# Patient Record
Sex: FEMALE | Race: WHITE | NOT HISPANIC OR LATINO | Employment: OTHER | URBAN - NONMETROPOLITAN AREA
[De-identification: names, ages, dates, MRNs, and addresses within clinical notes are randomized per-mention and may not be internally consistent; named-entity substitution may affect disease eponyms.]

---

## 2018-08-01 ENCOUNTER — HOSPITAL ENCOUNTER (EMERGENCY)
Facility: HOSPITAL | Age: 83
Discharge: HOME | End: 2018-08-01
Attending: EMERGENCY MEDICINE | Admitting: EMERGENCY MEDICINE

## 2018-08-01 ENCOUNTER — APPOINTMENT (OUTPATIENT)
Dept: ULTRASOUND IMAGING | Facility: HOSPITAL | Age: 83
End: 2018-08-01

## 2018-08-01 VITALS
TEMPERATURE: 99 F | HEIGHT: 64 IN | SYSTOLIC BLOOD PRESSURE: 160 MMHG | BODY MASS INDEX: 25.61 KG/M2 | WEIGHT: 150 LBS | RESPIRATION RATE: 18 BRPM | OXYGEN SATURATION: 98 % | DIASTOLIC BLOOD PRESSURE: 87 MMHG | HEART RATE: 63 BPM

## 2018-08-01 DIAGNOSIS — M79.604 RIGHT LEG PAIN: Primary | ICD-10-CM

## 2018-08-01 PROCEDURE — 99283 EMERGENCY DEPT VISIT LOW MDM: CPT

## 2018-08-01 PROCEDURE — 93971 EXTREMITY STUDY: CPT | Performed by: RADIOLOGY

## 2018-08-01 PROCEDURE — 93971 EXTREMITY STUDY: CPT

## 2018-08-01 RX ORDER — CLONIDINE HYDROCHLORIDE 0.1 MG/1
0.1 TABLET ORAL ONCE
Status: DISCONTINUED | OUTPATIENT
Start: 2018-08-01 | End: 2018-08-01 | Stop reason: HOSPADM

## 2018-08-01 RX ORDER — ASPIRIN 81 MG/1
81 TABLET, CHEWABLE ORAL DAILY
COMMUNITY
End: 2023-01-30

## 2018-08-01 RX ORDER — ATENOLOL 25 MG/1
25 TABLET ORAL DAILY
COMMUNITY
End: 2022-06-10 | Stop reason: DRUGHIGH

## 2018-08-01 RX ORDER — NAPROXEN SODIUM 220 MG
220 TABLET ORAL 2 TIMES DAILY PRN
COMMUNITY
End: 2018-08-01

## 2018-08-01 RX ORDER — NAPROXEN 500 MG/1
500 TABLET ORAL 2 TIMES DAILY PRN
Qty: 12 TABLET | Refills: 0 | Status: SHIPPED | OUTPATIENT
Start: 2018-08-01 | End: 2022-06-10

## 2018-08-01 NOTE — ED PROVIDER NOTES
Subjective     History provided by:  Patient   used: No    Lower Extremity Issue   Location:  Leg  Leg location:  R lower leg  Pain details:     Quality:  Aching    Radiates to:  Does not radiate    Severity:  Mild    Onset quality:  Sudden    Duration:  4 days    Timing:  Constant    Progression:  Worsening  Chronicity:  New  Dislocation: no    Foreign body present:  Unable to specify  Tetanus status:  Unknown  Prior injury to area:  No  Relieved by:  None tried  Worsened by:  Bearing weight, extension and flexion  Ineffective treatments:  None tried  Associated symptoms: no decreased ROM and no fever        Review of Systems   Constitutional: Negative for chills and fever.   HENT: Negative for congestion, ear pain and sore throat.    Respiratory: Negative for cough, shortness of breath and wheezing.    Cardiovascular: Negative for chest pain.   Gastrointestinal: Negative for diarrhea, nausea and vomiting.   Genitourinary: Negative for dysuria and flank pain.   Musculoskeletal: Positive for arthralgias.   Skin: Negative for rash.   Neurological: Negative for headaches.   Psychiatric/Behavioral: The patient is not nervous/anxious.    All other systems reviewed and are negative.      Past Medical History:   Diagnosis Date   • Arthritis    • DVT (deep venous thrombosis) (CMS/HCC)    • Hypertension        No Known Allergies    Past Surgical History:   Procedure Laterality Date   • HYSTERECTOMY     • LEG SURGERY Left        History reviewed. No pertinent family history.    Social History     Social History   • Marital status:      Social History Main Topics   • Smoking status: Never Smoker   • Alcohol use No   • Drug use: No   • Sexual activity: Defer     Other Topics Concern   • Not on file           Objective   Physical Exam   Constitutional: She is oriented to person, place, and time. She appears well-developed and well-nourished.   HENT:   Head: Normocephalic.   Mouth/Throat: Oropharynx is  clear and moist.   Neck: Neck supple.   Cardiovascular: Normal rate and regular rhythm.    Pulmonary/Chest: Effort normal and breath sounds normal.   Abdominal: Soft. Bowel sounds are normal. There is no tenderness.   Musculoskeletal: Normal range of motion.        Right lower leg: She exhibits tenderness and swelling.   Neurological: She is alert and oriented to person, place, and time.   Skin: Skin is warm and dry.   Psychiatric: She has a normal mood and affect. Her behavior is normal. Judgment and thought content normal.   Nursing note and vitals reviewed.      Procedures           ED Course                  MDM      Final diagnoses:   Right leg pain            KEN Patiño  08/01/18 2789

## 2018-08-01 NOTE — ED NOTES
/90 at this time. KEN Patiño aware states to hold clonidine 0.1 at this time.      Talita Reyes RN  08/01/18 1214

## 2021-05-15 ENCOUNTER — APPOINTMENT (OUTPATIENT)
Dept: GENERAL RADIOLOGY | Facility: HOSPITAL | Age: 86
End: 2021-05-15

## 2021-05-15 ENCOUNTER — HOSPITAL ENCOUNTER (OUTPATIENT)
Facility: HOSPITAL | Age: 86
Discharge: HOME | End: 2021-05-17
Attending: EMERGENCY MEDICINE | Admitting: SURGERY

## 2021-05-15 ENCOUNTER — APPOINTMENT (OUTPATIENT)
Dept: ULTRASOUND IMAGING | Facility: HOSPITAL | Age: 86
End: 2021-05-15

## 2021-05-15 DIAGNOSIS — K80.00 CALCULUS OF GALLBLADDER WITH ACUTE CHOLECYSTITIS WITHOUT OBSTRUCTION: Primary | ICD-10-CM

## 2021-05-15 DIAGNOSIS — K81.0 CHOLECYSTITIS, ACUTE: ICD-10-CM

## 2021-05-15 PROBLEM — Z86.718 HISTORY OF DVT (DEEP VEIN THROMBOSIS): Status: ACTIVE | Noted: 2021-05-15

## 2021-05-15 PROBLEM — N39.0 UTI (URINARY TRACT INFECTION): Status: ACTIVE | Noted: 2021-05-15

## 2021-05-15 PROBLEM — I10 ESSENTIAL HYPERTENSION: Status: ACTIVE | Noted: 2021-05-15

## 2021-05-15 LAB
ALBUMIN SERPL-MCNC: 3.7 G/DL (ref 3.5–5.2)
ALBUMIN/GLOB SERPL: 1.2 G/DL
ALP SERPL-CCNC: 80 U/L (ref 39–117)
ALT SERPL W P-5'-P-CCNC: 15 U/L (ref 1–33)
ANION GAP SERPL CALCULATED.3IONS-SCNC: 8 MMOL/L (ref 5–15)
AST SERPL-CCNC: 16 U/L (ref 1–32)
BACTERIA UR QL AUTO: ABNORMAL /HPF
BACTERIA UR QL AUTO: ABNORMAL /HPF
BASOPHILS # BLD AUTO: 0.03 10*3/MM3 (ref 0–0.2)
BASOPHILS NFR BLD AUTO: 0.2 % (ref 0–1.5)
BILIRUB SERPL-MCNC: 0.4 MG/DL (ref 0–1.2)
BILIRUB UR QL STRIP: ABNORMAL
BILIRUB UR QL STRIP: NEGATIVE
BUN SERPL-MCNC: 21 MG/DL (ref 8–23)
BUN/CREAT SERPL: 17.2 (ref 7–25)
CALCIUM SPEC-SCNC: 8.8 MG/DL (ref 8.6–10.5)
CHLORIDE SERPL-SCNC: 102 MMOL/L (ref 98–107)
CLARITY UR: ABNORMAL
CLARITY UR: CLEAR
CO2 SERPL-SCNC: 25 MMOL/L (ref 22–29)
COLOR UR: ABNORMAL
COLOR UR: YELLOW
CREAT SERPL-MCNC: 1.22 MG/DL (ref 0.57–1)
D-LACTATE SERPL-SCNC: 2.3 MMOL/L (ref 0.5–2)
DEPRECATED RDW RBC AUTO: 46.2 FL (ref 37–54)
EOSINOPHIL # BLD AUTO: 0.03 10*3/MM3 (ref 0–0.4)
EOSINOPHIL NFR BLD AUTO: 0.2 % (ref 0.3–6.2)
ERYTHROCYTE [DISTWIDTH] IN BLOOD BY AUTOMATED COUNT: 13.3 % (ref 12.3–15.4)
FLUAV RNA RESP QL NAA+PROBE: NOT DETECTED
FLUBV RNA RESP QL NAA+PROBE: NOT DETECTED
GFR SERPL CREATININE-BSD FRML MDRD: 42 ML/MIN/1.73
GLOBULIN UR ELPH-MCNC: 3 GM/DL
GLUCOSE SERPL-MCNC: 129 MG/DL (ref 65–99)
GLUCOSE UR STRIP-MCNC: NEGATIVE MG/DL
GLUCOSE UR STRIP-MCNC: NEGATIVE MG/DL
HCT VFR BLD AUTO: 43.1 % (ref 34–46.6)
HGB BLD-MCNC: 14 G/DL (ref 12–15.9)
HGB UR QL STRIP.AUTO: ABNORMAL
HGB UR QL STRIP.AUTO: ABNORMAL
HOLD SPECIMEN: NORMAL
HYALINE CASTS UR QL AUTO: ABNORMAL /LPF
HYALINE CASTS UR QL AUTO: ABNORMAL /LPF
IMM GRANULOCYTES # BLD AUTO: 0.08 10*3/MM3 (ref 0–0.05)
IMM GRANULOCYTES NFR BLD AUTO: 0.6 % (ref 0–0.5)
KETONES UR QL STRIP: ABNORMAL
KETONES UR QL STRIP: NEGATIVE
LEUKOCYTE ESTERASE UR QL STRIP.AUTO: ABNORMAL
LEUKOCYTE ESTERASE UR QL STRIP.AUTO: NEGATIVE
LIPASE SERPL-CCNC: 26 U/L (ref 13–60)
LYMPHOCYTES # BLD AUTO: 0.55 10*3/MM3 (ref 0.7–3.1)
LYMPHOCYTES NFR BLD AUTO: 4 % (ref 19.6–45.3)
MCH RBC QN AUTO: 30.4 PG (ref 26.6–33)
MCHC RBC AUTO-ENTMCNC: 32.5 G/DL (ref 31.5–35.7)
MCV RBC AUTO: 93.5 FL (ref 79–97)
MONOCYTES # BLD AUTO: 0.45 10*3/MM3 (ref 0.1–0.9)
MONOCYTES NFR BLD AUTO: 3.3 % (ref 5–12)
MUCOUS THREADS URNS QL MICRO: ABNORMAL /HPF
NEUTROPHILS NFR BLD AUTO: 12.69 10*3/MM3 (ref 1.7–7)
NEUTROPHILS NFR BLD AUTO: 91.7 % (ref 42.7–76)
NITRITE UR QL STRIP: NEGATIVE
NITRITE UR QL STRIP: POSITIVE
NRBC BLD AUTO-RTO: 0 /100 WBC (ref 0–0.2)
PH UR STRIP.AUTO: 5.5 [PH] (ref 5–8)
PH UR STRIP.AUTO: 5.5 [PH] (ref 5–8)
PLATELET # BLD AUTO: 207 10*3/MM3 (ref 140–450)
PMV BLD AUTO: 11.5 FL (ref 6–12)
POTASSIUM SERPL-SCNC: 4.4 MMOL/L (ref 3.5–5.2)
PROT SERPL-MCNC: 6.7 G/DL (ref 6–8.5)
PROT UR QL STRIP: ABNORMAL
PROT UR QL STRIP: ABNORMAL
RBC # BLD AUTO: 4.61 10*6/MM3 (ref 3.77–5.28)
RBC # UR: ABNORMAL /HPF
RBC # UR: ABNORMAL /HPF
REF LAB TEST METHOD: ABNORMAL
REF LAB TEST METHOD: ABNORMAL
SARS-COV-2 RNA RESP QL NAA+PROBE: NOT DETECTED
SODIUM SERPL-SCNC: 135 MMOL/L (ref 136–145)
SP GR UR STRIP: 1.03 (ref 1–1.03)
SP GR UR STRIP: 1.03 (ref 1–1.03)
SQUAMOUS #/AREA URNS HPF: ABNORMAL /HPF
SQUAMOUS #/AREA URNS HPF: ABNORMAL /HPF
TROPONIN T SERPL-MCNC: <0.01 NG/ML (ref 0–0.03)
UROBILINOGEN UR QL STRIP: ABNORMAL
UROBILINOGEN UR QL STRIP: ABNORMAL
WBC # BLD AUTO: 13.83 10*3/MM3 (ref 3.4–10.8)
WBC UR QL AUTO: ABNORMAL /HPF
WBC UR QL AUTO: ABNORMAL /HPF
WHOLE BLOOD HOLD SPECIMEN: NORMAL
WHOLE BLOOD HOLD SPECIMEN: NORMAL

## 2021-05-15 PROCEDURE — 84484 ASSAY OF TROPONIN QUANT: CPT | Performed by: EMERGENCY MEDICINE

## 2021-05-15 PROCEDURE — 83690 ASSAY OF LIPASE: CPT | Performed by: EMERGENCY MEDICINE

## 2021-05-15 PROCEDURE — P9612 CATHETERIZE FOR URINE SPEC: HCPCS

## 2021-05-15 PROCEDURE — 80053 COMPREHEN METABOLIC PANEL: CPT | Performed by: EMERGENCY MEDICINE

## 2021-05-15 PROCEDURE — 76705 ECHO EXAM OF ABDOMEN: CPT

## 2021-05-15 PROCEDURE — 25010000002 ONDANSETRON PER 1 MG: Performed by: SURGERY

## 2021-05-15 PROCEDURE — 96361 HYDRATE IV INFUSION ADD-ON: CPT

## 2021-05-15 PROCEDURE — 25010000002 PIPERACILLIN SOD-TAZOBACTAM PER 1 G: Performed by: INTERNAL MEDICINE

## 2021-05-15 PROCEDURE — 81001 URINALYSIS AUTO W/SCOPE: CPT | Performed by: NURSE PRACTITIONER

## 2021-05-15 PROCEDURE — 25010000002 ONDANSETRON PER 1 MG: Performed by: INTERNAL MEDICINE

## 2021-05-15 PROCEDURE — C9803 HOPD COVID-19 SPEC COLLECT: HCPCS

## 2021-05-15 PROCEDURE — 96372 THER/PROPH/DIAG INJ SC/IM: CPT

## 2021-05-15 PROCEDURE — 93005 ELECTROCARDIOGRAM TRACING: CPT | Performed by: EMERGENCY MEDICINE

## 2021-05-15 PROCEDURE — 25010000002 MORPHINE PER 10 MG: Performed by: INTERNAL MEDICINE

## 2021-05-15 PROCEDURE — 96365 THER/PROPH/DIAG IV INF INIT: CPT

## 2021-05-15 PROCEDURE — 83605 ASSAY OF LACTIC ACID: CPT | Performed by: NURSE PRACTITIONER

## 2021-05-15 PROCEDURE — 25010000002 HEPARIN (PORCINE) PER 1000 UNITS: Performed by: INTERNAL MEDICINE

## 2021-05-15 PROCEDURE — 81001 URINALYSIS AUTO W/SCOPE: CPT | Performed by: EMERGENCY MEDICINE

## 2021-05-15 PROCEDURE — G0378 HOSPITAL OBSERVATION PER HR: HCPCS

## 2021-05-15 PROCEDURE — 96375 TX/PRO/DX INJ NEW DRUG ADDON: CPT

## 2021-05-15 PROCEDURE — 71045 X-RAY EXAM CHEST 1 VIEW: CPT

## 2021-05-15 PROCEDURE — 87040 BLOOD CULTURE FOR BACTERIA: CPT | Performed by: INTERNAL MEDICINE

## 2021-05-15 PROCEDURE — 87636 SARSCOV2 & INF A&B AMP PRB: CPT | Performed by: INTERNAL MEDICINE

## 2021-05-15 PROCEDURE — 99284 EMERGENCY DEPT VISIT MOD MDM: CPT

## 2021-05-15 PROCEDURE — 85025 COMPLETE CBC W/AUTO DIFF WBC: CPT | Performed by: EMERGENCY MEDICINE

## 2021-05-15 PROCEDURE — 99223 1ST HOSP IP/OBS HIGH 75: CPT | Performed by: INTERNAL MEDICINE

## 2021-05-15 RX ORDER — ONDANSETRON 2 MG/ML
4 INJECTION INTRAMUSCULAR; INTRAVENOUS EVERY 6 HOURS PRN
Status: DISCONTINUED | OUTPATIENT
Start: 2021-05-15 | End: 2021-05-16 | Stop reason: SDUPTHER

## 2021-05-15 RX ORDER — HEPARIN SODIUM 5000 [USP'U]/ML
5000 INJECTION, SOLUTION INTRAVENOUS; SUBCUTANEOUS EVERY 8 HOURS SCHEDULED
Status: DISCONTINUED | OUTPATIENT
Start: 2021-05-16 | End: 2021-05-17 | Stop reason: HOSPADM

## 2021-05-15 RX ORDER — NALOXONE HCL 0.4 MG/ML
0.4 VIAL (ML) INJECTION
Status: DISCONTINUED | OUTPATIENT
Start: 2021-05-15 | End: 2021-05-17 | Stop reason: HOSPADM

## 2021-05-15 RX ORDER — HEPARIN SODIUM 5000 [USP'U]/ML
5000 INJECTION, SOLUTION INTRAVENOUS; SUBCUTANEOUS EVERY 12 HOURS SCHEDULED
Status: COMPLETED | OUTPATIENT
Start: 2021-05-15 | End: 2021-05-15

## 2021-05-15 RX ORDER — MORPHINE SULFATE 2 MG/ML
2 INJECTION, SOLUTION INTRAMUSCULAR; INTRAVENOUS
Status: DISCONTINUED | OUTPATIENT
Start: 2021-05-15 | End: 2021-05-15 | Stop reason: SDUPTHER

## 2021-05-15 RX ORDER — SODIUM CHLORIDE, SODIUM LACTATE, POTASSIUM CHLORIDE, CALCIUM CHLORIDE 600; 310; 30; 20 MG/100ML; MG/100ML; MG/100ML; MG/100ML
75 INJECTION, SOLUTION INTRAVENOUS CONTINUOUS
Status: DISCONTINUED | OUTPATIENT
Start: 2021-05-15 | End: 2021-05-16

## 2021-05-15 RX ORDER — ONDANSETRON 4 MG/1
4 TABLET, FILM COATED ORAL EVERY 6 HOURS PRN
Status: DISCONTINUED | OUTPATIENT
Start: 2021-05-15 | End: 2021-05-17 | Stop reason: HOSPADM

## 2021-05-15 RX ORDER — MORPHINE SULFATE 2 MG/ML
1 INJECTION, SOLUTION INTRAMUSCULAR; INTRAVENOUS EVERY 4 HOURS PRN
Status: DISCONTINUED | OUTPATIENT
Start: 2021-05-15 | End: 2021-05-17 | Stop reason: HOSPADM

## 2021-05-15 RX ORDER — ATENOLOL 25 MG/1
25 TABLET ORAL DAILY
Status: DISCONTINUED | OUTPATIENT
Start: 2021-05-16 | End: 2021-05-17 | Stop reason: HOSPADM

## 2021-05-15 RX ORDER — SODIUM CHLORIDE 0.9 % (FLUSH) 0.9 %
10 SYRINGE (ML) INJECTION AS NEEDED
Status: DISCONTINUED | OUTPATIENT
Start: 2021-05-15 | End: 2021-05-17 | Stop reason: HOSPADM

## 2021-05-15 RX ORDER — SODIUM CHLORIDE, SODIUM LACTATE, POTASSIUM CHLORIDE, CALCIUM CHLORIDE 600; 310; 30; 20 MG/100ML; MG/100ML; MG/100ML; MG/100ML
75 INJECTION, SOLUTION INTRAVENOUS CONTINUOUS
Status: DISCONTINUED | OUTPATIENT
Start: 2021-05-15 | End: 2021-05-15 | Stop reason: SDUPTHER

## 2021-05-15 RX ORDER — ONDANSETRON 2 MG/ML
4 INJECTION INTRAMUSCULAR; INTRAVENOUS EVERY 6 HOURS PRN
Status: DISCONTINUED | OUTPATIENT
Start: 2021-05-15 | End: 2021-05-17 | Stop reason: HOSPADM

## 2021-05-15 RX ADMIN — SODIUM CHLORIDE, POTASSIUM CHLORIDE, SODIUM LACTATE AND CALCIUM CHLORIDE 75 ML/HR: 600; 310; 30; 20 INJECTION, SOLUTION INTRAVENOUS at 22:09

## 2021-05-15 RX ADMIN — ONDANSETRON 4 MG: 2 INJECTION INTRAMUSCULAR; INTRAVENOUS at 21:03

## 2021-05-15 RX ADMIN — SODIUM CHLORIDE, POTASSIUM CHLORIDE, SODIUM LACTATE AND CALCIUM CHLORIDE 75 ML/HR: 600; 310; 30; 20 INJECTION, SOLUTION INTRAVENOUS at 21:38

## 2021-05-15 RX ADMIN — SODIUM CHLORIDE 500 ML: 9 INJECTION, SOLUTION INTRAVENOUS at 18:25

## 2021-05-15 RX ADMIN — HEPARIN SODIUM 5000 UNITS: 5000 INJECTION INTRAVENOUS; SUBCUTANEOUS at 21:00

## 2021-05-15 RX ADMIN — TAZOBACTAM SODIUM AND PIPERACILLIN SODIUM 3.38 G: 375; 3 INJECTION, SOLUTION INTRAVENOUS at 21:08

## 2021-05-15 RX ADMIN — MORPHINE SULFATE 2 MG: 2 INJECTION, SOLUTION INTRAMUSCULAR; INTRAVENOUS at 21:04

## 2021-05-15 RX ADMIN — SODIUM CHLORIDE, POTASSIUM CHLORIDE, SODIUM LACTATE AND CALCIUM CHLORIDE 500 ML: 600; 310; 30; 20 INJECTION, SOLUTION INTRAVENOUS at 20:58

## 2021-05-16 ENCOUNTER — ANESTHESIA EVENT (OUTPATIENT)
Dept: PERIOP | Facility: HOSPITAL | Age: 86
End: 2021-05-16

## 2021-05-16 ENCOUNTER — APPOINTMENT (OUTPATIENT)
Dept: GENERAL RADIOLOGY | Facility: HOSPITAL | Age: 86
End: 2021-05-16

## 2021-05-16 ENCOUNTER — ANESTHESIA (OUTPATIENT)
Dept: PERIOP | Facility: HOSPITAL | Age: 86
End: 2021-05-16

## 2021-05-16 LAB
ANION GAP SERPL CALCULATED.3IONS-SCNC: 7 MMOL/L (ref 5–15)
BASOPHILS # BLD AUTO: 0.01 10*3/MM3 (ref 0–0.2)
BASOPHILS NFR BLD AUTO: 0.2 % (ref 0–1.5)
BUN SERPL-MCNC: 20 MG/DL (ref 8–23)
BUN/CREAT SERPL: 18.3 (ref 7–25)
CALCIUM SPEC-SCNC: 8.5 MG/DL (ref 8.6–10.5)
CHLORIDE SERPL-SCNC: 104 MMOL/L (ref 98–107)
CO2 SERPL-SCNC: 26 MMOL/L (ref 22–29)
CREAT SERPL-MCNC: 1.09 MG/DL (ref 0.57–1)
DEPRECATED RDW RBC AUTO: 45.6 FL (ref 37–54)
EOSINOPHIL # BLD AUTO: 0.02 10*3/MM3 (ref 0–0.4)
EOSINOPHIL NFR BLD AUTO: 0.4 % (ref 0.3–6.2)
ERYTHROCYTE [DISTWIDTH] IN BLOOD BY AUTOMATED COUNT: 13.3 % (ref 12.3–15.4)
GFR SERPL CREATININE-BSD FRML MDRD: 48 ML/MIN/1.73
GLUCOSE SERPL-MCNC: 103 MG/DL (ref 65–99)
HCT VFR BLD AUTO: 37 % (ref 34–46.6)
HGB BLD-MCNC: 12 G/DL (ref 12–15.9)
IMM GRANULOCYTES # BLD AUTO: 0.02 10*3/MM3 (ref 0–0.05)
IMM GRANULOCYTES NFR BLD AUTO: 0.4 % (ref 0–0.5)
LYMPHOCYTES # BLD AUTO: 0.46 10*3/MM3 (ref 0.7–3.1)
LYMPHOCYTES NFR BLD AUTO: 8.5 % (ref 19.6–45.3)
MCH RBC QN AUTO: 30.5 PG (ref 26.6–33)
MCHC RBC AUTO-ENTMCNC: 32.4 G/DL (ref 31.5–35.7)
MCV RBC AUTO: 93.9 FL (ref 79–97)
MONOCYTES # BLD AUTO: 0.32 10*3/MM3 (ref 0.1–0.9)
MONOCYTES NFR BLD AUTO: 5.9 % (ref 5–12)
NEUTROPHILS NFR BLD AUTO: 4.6 10*3/MM3 (ref 1.7–7)
NEUTROPHILS NFR BLD AUTO: 84.6 % (ref 42.7–76)
NRBC BLD AUTO-RTO: 0 /100 WBC (ref 0–0.2)
PLATELET # BLD AUTO: 143 10*3/MM3 (ref 140–450)
PMV BLD AUTO: 12 FL (ref 6–12)
POTASSIUM SERPL-SCNC: 4.1 MMOL/L (ref 3.5–5.2)
RBC # BLD AUTO: 3.94 10*6/MM3 (ref 3.77–5.28)
SODIUM SERPL-SCNC: 137 MMOL/L (ref 136–145)
WBC # BLD AUTO: 5.43 10*3/MM3 (ref 3.4–10.8)

## 2021-05-16 PROCEDURE — A9270 NON-COVERED ITEM OR SERVICE: HCPCS | Performed by: SURGERY

## 2021-05-16 PROCEDURE — 25010000002 FENTANYL CITRATE (PF) 100 MCG/2ML SOLUTION: Performed by: NURSE ANESTHETIST, CERTIFIED REGISTERED

## 2021-05-16 PROCEDURE — 25010000002 NEOSTIGMINE 10 MG/10ML SOLUTION: Performed by: NURSE ANESTHETIST, CERTIFIED REGISTERED

## 2021-05-16 PROCEDURE — 25010000002 FENTANYL CITRATE (PF) 50 MCG/ML SOLUTION: Performed by: NURSE ANESTHETIST, CERTIFIED REGISTERED

## 2021-05-16 PROCEDURE — 74300 X-RAY BILE DUCTS/PANCREAS: CPT

## 2021-05-16 PROCEDURE — 25010000002 PIPERACILLIN SOD-TAZOBACTAM PER 1 G: Performed by: NURSE PRACTITIONER

## 2021-05-16 PROCEDURE — 25010000002 PIPERACILLIN SOD-TAZOBACTAM PER 1 G: Performed by: SURGERY

## 2021-05-16 PROCEDURE — 63710000001 DOCUSATE SODIUM 100 MG CAPSULE: Performed by: SURGERY

## 2021-05-16 PROCEDURE — 25010000002 PROPOFOL 10 MG/ML EMULSION: Performed by: NURSE ANESTHETIST, CERTIFIED REGISTERED

## 2021-05-16 PROCEDURE — 25010000002 DEXAMETHASONE PER 1 MG: Performed by: NURSE ANESTHETIST, CERTIFIED REGISTERED

## 2021-05-16 PROCEDURE — 96361 HYDRATE IV INFUSION ADD-ON: CPT

## 2021-05-16 PROCEDURE — 85025 COMPLETE CBC W/AUTO DIFF WBC: CPT | Performed by: NURSE PRACTITIONER

## 2021-05-16 PROCEDURE — 80048 BASIC METABOLIC PNL TOTAL CA: CPT | Performed by: NURSE PRACTITIONER

## 2021-05-16 PROCEDURE — 63710000001 ATENOLOL 25 MG TABLET: Performed by: SURGERY

## 2021-05-16 PROCEDURE — 25010000002 IOPAMIDOL 61 % SOLUTION: Performed by: SURGERY

## 2021-05-16 PROCEDURE — 99226 PR SBSQ OBSERVATION CARE/DAY 35 MINUTES: CPT | Performed by: NURSE PRACTITIONER

## 2021-05-16 PROCEDURE — 25010000003 CEFAZOLIN PER 500 MG: Performed by: NURSE ANESTHETIST, CERTIFIED REGISTERED

## 2021-05-16 PROCEDURE — G0378 HOSPITAL OBSERVATION PER HR: HCPCS

## 2021-05-16 PROCEDURE — 25010000002 MORPHINE PER 10 MG: Performed by: SURGERY

## 2021-05-16 PROCEDURE — 25010000002 ONDANSETRON PER 1 MG: Performed by: NURSE ANESTHETIST, CERTIFIED REGISTERED

## 2021-05-16 PROCEDURE — C1889 IMPLANT/INSERT DEVICE, NOC: HCPCS | Performed by: SURGERY

## 2021-05-16 PROCEDURE — 25010000002 ONDANSETRON PER 1 MG: Performed by: SURGERY

## 2021-05-16 PROCEDURE — 88304 TISSUE EXAM BY PATHOLOGIST: CPT | Performed by: SURGERY

## 2021-05-16 PROCEDURE — 25010000002 HEPARIN (PORCINE) PER 1000 UNITS: Performed by: SURGERY

## 2021-05-16 DEVICE — LIGACLIP 10-M/L, 10MM ENDOSCOPIC ROTATING MULTIPLE CLIP APPLIERS
Type: IMPLANTABLE DEVICE | Site: ABDOMEN | Status: FUNCTIONAL
Brand: LIGACLIP

## 2021-05-16 RX ORDER — LIDOCAINE HYDROCHLORIDE 10 MG/ML
INJECTION, SOLUTION EPIDURAL; INFILTRATION; INTRACAUDAL; PERINEURAL AS NEEDED
Status: DISCONTINUED | OUTPATIENT
Start: 2021-05-16 | End: 2021-05-16 | Stop reason: SURG

## 2021-05-16 RX ORDER — FAMOTIDINE 10 MG/ML
20 INJECTION, SOLUTION INTRAVENOUS
Status: COMPLETED | OUTPATIENT
Start: 2021-05-16 | End: 2021-05-16

## 2021-05-16 RX ORDER — BUPIVACAINE HYDROCHLORIDE AND EPINEPHRINE 2.5; 5 MG/ML; UG/ML
INJECTION, SOLUTION EPIDURAL; INFILTRATION; INTRACAUDAL; PERINEURAL AS NEEDED
Status: DISCONTINUED | OUTPATIENT
Start: 2021-05-16 | End: 2021-05-16 | Stop reason: HOSPADM

## 2021-05-16 RX ORDER — OXYCODONE HYDROCHLORIDE AND ACETAMINOPHEN 5; 325 MG/1; MG/1
2 TABLET ORAL EVERY 4 HOURS PRN
Status: DISCONTINUED | OUTPATIENT
Start: 2021-05-16 | End: 2021-05-17 | Stop reason: HOSPADM

## 2021-05-16 RX ORDER — SODIUM CHLORIDE, SODIUM LACTATE, POTASSIUM CHLORIDE, CALCIUM CHLORIDE 600; 310; 30; 20 MG/100ML; MG/100ML; MG/100ML; MG/100ML
INJECTION, SOLUTION INTRAVENOUS CONTINUOUS PRN
Status: DISCONTINUED | OUTPATIENT
Start: 2021-05-16 | End: 2021-05-16 | Stop reason: SURG

## 2021-05-16 RX ORDER — FAMOTIDINE 20 MG/1
20 TABLET, FILM COATED ORAL ONCE
Status: CANCELLED | OUTPATIENT
Start: 2021-05-16 | End: 2021-05-16

## 2021-05-16 RX ORDER — MIDAZOLAM HYDROCHLORIDE 1 MG/ML
0.5 INJECTION INTRAMUSCULAR; INTRAVENOUS
Status: CANCELLED | OUTPATIENT
Start: 2021-05-16

## 2021-05-16 RX ORDER — SODIUM CHLORIDE 0.9 % (FLUSH) 0.9 %
10 SYRINGE (ML) INJECTION EVERY 12 HOURS SCHEDULED
Status: CANCELLED | OUTPATIENT
Start: 2021-05-16

## 2021-05-16 RX ORDER — SODIUM CHLORIDE, SODIUM LACTATE, POTASSIUM CHLORIDE, CALCIUM CHLORIDE 600; 310; 30; 20 MG/100ML; MG/100ML; MG/100ML; MG/100ML
100 INJECTION, SOLUTION INTRAVENOUS CONTINUOUS
Status: DISCONTINUED | OUTPATIENT
Start: 2021-05-16 | End: 2021-05-17 | Stop reason: HOSPADM

## 2021-05-16 RX ORDER — DOCUSATE SODIUM 100 MG/1
100 CAPSULE, LIQUID FILLED ORAL 2 TIMES DAILY
Status: DISCONTINUED | OUTPATIENT
Start: 2021-05-16 | End: 2021-05-17 | Stop reason: HOSPADM

## 2021-05-16 RX ORDER — SODIUM CHLORIDE, SODIUM LACTATE, POTASSIUM CHLORIDE, CALCIUM CHLORIDE 600; 310; 30; 20 MG/100ML; MG/100ML; MG/100ML; MG/100ML
9 INJECTION, SOLUTION INTRAVENOUS CONTINUOUS
Status: CANCELLED | OUTPATIENT
Start: 2021-05-16

## 2021-05-16 RX ORDER — LABETALOL HYDROCHLORIDE 5 MG/ML
5 INJECTION, SOLUTION INTRAVENOUS
Status: DISCONTINUED | OUTPATIENT
Start: 2021-05-16 | End: 2021-05-16 | Stop reason: HOSPADM

## 2021-05-16 RX ORDER — FAMOTIDINE 10 MG/ML
20 INJECTION, SOLUTION INTRAVENOUS ONCE
Status: CANCELLED | OUTPATIENT
Start: 2021-05-16 | End: 2021-05-16

## 2021-05-16 RX ORDER — CEFAZOLIN SODIUM 1 G/3ML
INJECTION, POWDER, FOR SOLUTION INTRAMUSCULAR; INTRAVENOUS AS NEEDED
Status: DISCONTINUED | OUTPATIENT
Start: 2021-05-16 | End: 2021-05-16 | Stop reason: SURG

## 2021-05-16 RX ORDER — ONDANSETRON 2 MG/ML
4 INJECTION INTRAMUSCULAR; INTRAVENOUS EVERY 6 HOURS PRN
Status: DISCONTINUED | OUTPATIENT
Start: 2021-05-16 | End: 2021-05-17 | Stop reason: HOSPADM

## 2021-05-16 RX ORDER — DEXAMETHASONE SODIUM PHOSPHATE 4 MG/ML
INJECTION, SOLUTION INTRA-ARTICULAR; INTRALESIONAL; INTRAMUSCULAR; INTRAVENOUS; SOFT TISSUE AS NEEDED
Status: DISCONTINUED | OUTPATIENT
Start: 2021-05-16 | End: 2021-05-16 | Stop reason: SURG

## 2021-05-16 RX ORDER — SODIUM CHLORIDE 0.9 % (FLUSH) 0.9 %
10 SYRINGE (ML) INJECTION AS NEEDED
Status: DISCONTINUED | OUTPATIENT
Start: 2021-05-16 | End: 2021-05-16 | Stop reason: HOSPADM

## 2021-05-16 RX ORDER — ESMOLOL HYDROCHLORIDE 10 MG/ML
INJECTION INTRAVENOUS AS NEEDED
Status: DISCONTINUED | OUTPATIENT
Start: 2021-05-16 | End: 2021-05-16 | Stop reason: SURG

## 2021-05-16 RX ORDER — ONDANSETRON 2 MG/ML
INJECTION INTRAMUSCULAR; INTRAVENOUS AS NEEDED
Status: DISCONTINUED | OUTPATIENT
Start: 2021-05-16 | End: 2021-05-16 | Stop reason: SURG

## 2021-05-16 RX ORDER — HYDROMORPHONE HYDROCHLORIDE 1 MG/ML
0.5 INJECTION, SOLUTION INTRAMUSCULAR; INTRAVENOUS; SUBCUTANEOUS
Status: DISCONTINUED | OUTPATIENT
Start: 2021-05-16 | End: 2021-05-16 | Stop reason: HOSPADM

## 2021-05-16 RX ORDER — EPHEDRINE SULFATE 50 MG/ML
INJECTION, SOLUTION INTRAVENOUS AS NEEDED
Status: DISCONTINUED | OUTPATIENT
Start: 2021-05-16 | End: 2021-05-16 | Stop reason: SURG

## 2021-05-16 RX ORDER — FENTANYL CITRATE 50 UG/ML
50 INJECTION, SOLUTION INTRAMUSCULAR; INTRAVENOUS
Status: DISCONTINUED | OUTPATIENT
Start: 2021-05-16 | End: 2021-05-16 | Stop reason: HOSPADM

## 2021-05-16 RX ORDER — SODIUM CHLORIDE 9 MG/ML
INJECTION, SOLUTION INTRAVENOUS AS NEEDED
Status: DISCONTINUED | OUTPATIENT
Start: 2021-05-16 | End: 2021-05-16 | Stop reason: HOSPADM

## 2021-05-16 RX ORDER — FENTANYL CITRATE 50 UG/ML
INJECTION, SOLUTION INTRAMUSCULAR; INTRAVENOUS AS NEEDED
Status: DISCONTINUED | OUTPATIENT
Start: 2021-05-16 | End: 2021-05-16 | Stop reason: SURG

## 2021-05-16 RX ORDER — SODIUM CHLORIDE, SODIUM LACTATE, POTASSIUM CHLORIDE, CALCIUM CHLORIDE 600; 310; 30; 20 MG/100ML; MG/100ML; MG/100ML; MG/100ML
1000 INJECTION, SOLUTION INTRAVENOUS CONTINUOUS
Status: DISCONTINUED | OUTPATIENT
Start: 2021-05-16 | End: 2021-05-16

## 2021-05-16 RX ORDER — ONDANSETRON 2 MG/ML
4 INJECTION INTRAMUSCULAR; INTRAVENOUS ONCE AS NEEDED
Status: DISCONTINUED | OUTPATIENT
Start: 2021-05-16 | End: 2021-05-16 | Stop reason: HOSPADM

## 2021-05-16 RX ORDER — FENTANYL CITRATE 50 UG/ML
50 INJECTION, SOLUTION INTRAMUSCULAR; INTRAVENOUS
Status: DISCONTINUED | OUTPATIENT
Start: 2021-05-16 | End: 2021-05-16 | Stop reason: SDUPTHER

## 2021-05-16 RX ORDER — IPRATROPIUM BROMIDE AND ALBUTEROL SULFATE 2.5; .5 MG/3ML; MG/3ML
3 SOLUTION RESPIRATORY (INHALATION) ONCE AS NEEDED
Status: DISCONTINUED | OUTPATIENT
Start: 2021-05-16 | End: 2021-05-16 | Stop reason: HOSPADM

## 2021-05-16 RX ORDER — NEOSTIGMINE METHYLSULFATE 1 MG/ML
INJECTION, SOLUTION INTRAVENOUS AS NEEDED
Status: DISCONTINUED | OUTPATIENT
Start: 2021-05-16 | End: 2021-05-16 | Stop reason: SURG

## 2021-05-16 RX ORDER — ROCURONIUM BROMIDE 10 MG/ML
INJECTION, SOLUTION INTRAVENOUS AS NEEDED
Status: DISCONTINUED | OUTPATIENT
Start: 2021-05-16 | End: 2021-05-16 | Stop reason: SURG

## 2021-05-16 RX ORDER — BUPIVACAINE HCL/0.9 % NACL/PF 0.125 %
PLASTIC BAG, INJECTION (ML) EPIDURAL AS NEEDED
Status: DISCONTINUED | OUTPATIENT
Start: 2021-05-16 | End: 2021-05-16 | Stop reason: SURG

## 2021-05-16 RX ORDER — LIDOCAINE HYDROCHLORIDE 10 MG/ML
0.5 INJECTION, SOLUTION EPIDURAL; INFILTRATION; INTRACAUDAL; PERINEURAL ONCE AS NEEDED
Status: CANCELLED | OUTPATIENT
Start: 2021-05-16

## 2021-05-16 RX ORDER — FAMOTIDINE 20 MG/1
20 TABLET, FILM COATED ORAL
Status: COMPLETED | OUTPATIENT
Start: 2021-05-16 | End: 2021-05-16

## 2021-05-16 RX ORDER — SODIUM CHLORIDE 0.9 % (FLUSH) 0.9 %
10 SYRINGE (ML) INJECTION AS NEEDED
Status: CANCELLED | OUTPATIENT
Start: 2021-05-16

## 2021-05-16 RX ORDER — PROMETHAZINE HYDROCHLORIDE 25 MG/1
25 TABLET ORAL EVERY 6 HOURS PRN
Status: DISCONTINUED | OUTPATIENT
Start: 2021-05-16 | End: 2021-05-17 | Stop reason: HOSPADM

## 2021-05-16 RX ORDER — HYDROMORPHONE HYDROCHLORIDE 1 MG/ML
0.5 INJECTION, SOLUTION INTRAMUSCULAR; INTRAVENOUS; SUBCUTANEOUS
Status: DISCONTINUED | OUTPATIENT
Start: 2021-05-16 | End: 2021-05-16 | Stop reason: SDUPTHER

## 2021-05-16 RX ORDER — PROPOFOL 10 MG/ML
VIAL (ML) INTRAVENOUS AS NEEDED
Status: DISCONTINUED | OUTPATIENT
Start: 2021-05-16 | End: 2021-05-16 | Stop reason: SURG

## 2021-05-16 RX ORDER — GLYCOPYRROLATE 0.2 MG/ML
INJECTION INTRAMUSCULAR; INTRAVENOUS AS NEEDED
Status: DISCONTINUED | OUTPATIENT
Start: 2021-05-16 | End: 2021-05-16 | Stop reason: SURG

## 2021-05-16 RX ADMIN — TAZOBACTAM SODIUM AND PIPERACILLIN SODIUM 3.38 G: 375; 3 INJECTION, SOLUTION INTRAVENOUS at 20:08

## 2021-05-16 RX ADMIN — ESMOLOL HYDROCHLORIDE 10 MG: 10 INJECTION, SOLUTION INTRAVENOUS at 08:50

## 2021-05-16 RX ADMIN — GLYCOPYRROLATE 0.2 MG: 0.4 INJECTION INTRAMUSCULAR; INTRAVENOUS at 08:18

## 2021-05-16 RX ADMIN — MORPHINE SULFATE 1 MG: 2 INJECTION, SOLUTION INTRAMUSCULAR; INTRAVENOUS at 11:36

## 2021-05-16 RX ADMIN — HEPARIN SODIUM 5000 UNITS: 5000 INJECTION, SOLUTION INTRAVENOUS; SUBCUTANEOUS at 20:08

## 2021-05-16 RX ADMIN — ESMOLOL HYDROCHLORIDE 10 MG: 10 INJECTION, SOLUTION INTRAVENOUS at 08:34

## 2021-05-16 RX ADMIN — CEFAZOLIN SODIUM 2 G: 1 INJECTION, POWDER, FOR SOLUTION INTRAMUSCULAR; INTRAVENOUS at 08:18

## 2021-05-16 RX ADMIN — SODIUM CHLORIDE, POTASSIUM CHLORIDE, SODIUM LACTATE AND CALCIUM CHLORIDE: 600; 310; 30; 20 INJECTION, SOLUTION INTRAVENOUS at 07:58

## 2021-05-16 RX ADMIN — EPHEDRINE SULFATE 10 MG: 50 INJECTION, SOLUTION INTRAVENOUS at 08:18

## 2021-05-16 RX ADMIN — TAZOBACTAM SODIUM AND PIPERACILLIN SODIUM 3.38 G: 375; 3 INJECTION, SOLUTION INTRAVENOUS at 02:35

## 2021-05-16 RX ADMIN — ATENOLOL 25 MG: 25 TABLET ORAL at 07:23

## 2021-05-16 RX ADMIN — Medication 100 MCG: at 08:05

## 2021-05-16 RX ADMIN — NEOSTIGMINE 3 MG: 1 INJECTION INTRAVENOUS at 08:59

## 2021-05-16 RX ADMIN — ONDANSETRON 4 MG: 2 INJECTION INTRAMUSCULAR; INTRAVENOUS at 08:58

## 2021-05-16 RX ADMIN — ROCURONIUM BROMIDE 30 MG: 10 INJECTION INTRAVENOUS at 08:05

## 2021-05-16 RX ADMIN — FAMOTIDINE 20 MG: 10 INJECTION INTRAVENOUS at 07:42

## 2021-05-16 RX ADMIN — ONDANSETRON 4 MG: 2 INJECTION INTRAMUSCULAR; INTRAVENOUS at 11:37

## 2021-05-16 RX ADMIN — SODIUM CHLORIDE, POTASSIUM CHLORIDE, SODIUM LACTATE AND CALCIUM CHLORIDE: 600; 310; 30; 20 INJECTION, SOLUTION INTRAVENOUS at 09:10

## 2021-05-16 RX ADMIN — GLYCOPYRROLATE 0.4 MG: 0.4 INJECTION INTRAMUSCULAR; INTRAVENOUS at 08:59

## 2021-05-16 RX ADMIN — TAZOBACTAM SODIUM AND PIPERACILLIN SODIUM 3.38 G: 375; 3 INJECTION, SOLUTION INTRAVENOUS at 11:36

## 2021-05-16 RX ADMIN — FENTANYL CITRATE 100 MCG: 50 INJECTION, SOLUTION INTRAMUSCULAR; INTRAVENOUS at 08:05

## 2021-05-16 RX ADMIN — SODIUM CHLORIDE, PRESERVATIVE FREE 10 ML: 5 INJECTION INTRAVENOUS at 07:42

## 2021-05-16 RX ADMIN — ESMOLOL HYDROCHLORIDE 20 MG: 10 INJECTION, SOLUTION INTRAVENOUS at 08:43

## 2021-05-16 RX ADMIN — SODIUM CHLORIDE, POTASSIUM CHLORIDE, SODIUM LACTATE AND CALCIUM CHLORIDE 75 ML/HR: 600; 310; 30; 20 INJECTION, SOLUTION INTRAVENOUS at 07:43

## 2021-05-16 RX ADMIN — PROPOFOL 130 MG: 10 INJECTION, EMULSION INTRAVENOUS at 08:05

## 2021-05-16 RX ADMIN — DEXAMETHASONE SODIUM PHOSPHATE 4 MG: 4 INJECTION, SOLUTION INTRA-ARTICULAR; INTRALESIONAL; INTRAMUSCULAR; INTRAVENOUS; SOFT TISSUE at 08:05

## 2021-05-16 RX ADMIN — DOCUSATE SODIUM 100 MG: 100 CAPSULE, LIQUID FILLED ORAL at 20:08

## 2021-05-16 RX ADMIN — SODIUM CHLORIDE, POTASSIUM CHLORIDE, SODIUM LACTATE AND CALCIUM CHLORIDE 100 ML/HR: 600; 310; 30; 20 INJECTION, SOLUTION INTRAVENOUS at 11:38

## 2021-05-16 RX ADMIN — GLYCOPYRROLATE 0.2 MG: 0.4 INJECTION INTRAMUSCULAR; INTRAVENOUS at 08:15

## 2021-05-16 RX ADMIN — ESMOLOL HYDROCHLORIDE 10 MG: 10 INJECTION, SOLUTION INTRAVENOUS at 08:28

## 2021-05-16 RX ADMIN — LIDOCAINE HYDROCHLORIDE 30 MG: 10 INJECTION, SOLUTION EPIDURAL; INFILTRATION; INTRACAUDAL; PERINEURAL at 08:05

## 2021-05-16 NOTE — ANESTHESIA POSTPROCEDURE EVALUATION
Patient: Emerita Lew    Procedure Summary     Date: 05/16/21 Room / [facility]:  [facility]    Anesthesia Start: 0758 Anesthesia Stop:     Procedure: CHOLECYSTECTOMY LAPAROSCOPIC INTRAOPERATIVE CHOLANGIOGRAM (N/A Abdomen) Diagnosis:     Surgeons: Timo Monet MD Provider: Duran Herrera MD    Anesthesia Type: general ASA Status: 3          Anesthesia Type: general    Vitals  Vitals Value Taken Time   BP     Temp     Pulse     Resp     SpO2 95 % 05/16/21 0910   Vitals shown include unvalidated device data.        Post Anesthesia Care and Evaluation    Patient location during evaluation: PACU  Patient participation: complete - patient participated  Level of consciousness: awake  Pain score: 0  Pain management: adequate  Airway patency: patent  Anesthetic complications: No anesthetic complications  PONV Status: none  Cardiovascular status: acceptable and stable  Respiratory status: nasal cannula, unassisted, acceptable and spontaneous ventilation  Hydration status: acceptable

## 2021-05-16 NOTE — ANESTHESIA PREPROCEDURE EVALUATION
Anesthesia Evaluation     Patient summary reviewed and Nursing notes reviewed                Airway   Mallampati: I  TM distance: >3 FB  Neck ROM: full  No difficulty expected  Dental - normal exam   (+) edentulous    Pulmonary - negative pulmonary ROS and normal exam   Cardiovascular - normal exam    (+) hypertension, DVT,       Neuro/Psych- negative ROS  GI/Hepatic/Renal/Endo - negative ROS     Musculoskeletal     Abdominal  - normal exam    Bowel sounds: normal.   Substance History - negative use     OB/GYN negative ob/gyn ROS         Other   arthritis,                      Anesthesia Plan    ASA 3     general     intravenous induction     Anesthetic plan, all risks, benefits, and alternatives have been provided, discussed and informed consent has been obtained with: patient.    Plan discussed with CRNA.

## 2021-05-16 NOTE — ANESTHESIA PROCEDURE NOTES
Airway  Urgency: elective    Date/Time: 5/16/2021 8:07 AM  Airway not difficult    General Information and Staff    Patient location during procedure: OR  CRNA: Jerrod Rasmussen CRNA    Indications and Patient Condition  Indications for airway management: airway protection    Preoxygenated: yes  MILS not maintained throughout  Mask difficulty assessment: 2 - vent by mask + OA or adjuvant +/- NMBA    Final Airway Details  Final airway type: endotracheal airway      Successful airway: ETT  Cuffed: yes   Successful intubation technique: direct laryngoscopy  Endotracheal tube insertion site: oral  Blade: Weinstein  Blade size: 2  ETT size (mm): 7.0  Cormack-Lehane Classification: grade I - full view of glottis  Placement verified by: chest auscultation and capnometry   Cuff volume (mL): 5  Measured from: lips  ETT/EBT  to lips (cm): 20  Number of attempts at approach: 1  Assessment: lips, teeth, and gum same as pre-op and atraumatic intubation    Additional Comments  Negative epigastric sounds, Breath sound equal bilaterally with symmetric chest rise and fall

## 2021-05-17 VITALS
WEIGHT: 155.3 LBS | RESPIRATION RATE: 18 BRPM | TEMPERATURE: 98.4 F | HEIGHT: 64 IN | DIASTOLIC BLOOD PRESSURE: 73 MMHG | OXYGEN SATURATION: 91 % | SYSTOLIC BLOOD PRESSURE: 145 MMHG | BODY MASS INDEX: 26.51 KG/M2 | HEART RATE: 60 BPM

## 2021-05-17 PROBLEM — K80.00 CALCULUS OF GALLBLADDER WITH ACUTE CHOLECYSTITIS WITHOUT OBSTRUCTION: Status: RESOLVED | Noted: 2021-05-15 | Resolved: 2021-05-17

## 2021-05-17 PROBLEM — N39.0 UTI (URINARY TRACT INFECTION): Status: RESOLVED | Noted: 2021-05-15 | Resolved: 2021-05-17

## 2021-05-17 PROBLEM — K81.0 ACUTE CHOLECYSTITIS: Status: RESOLVED | Noted: 2021-05-15 | Resolved: 2021-05-17

## 2021-05-17 LAB
ALBUMIN SERPL-MCNC: 3.3 G/DL (ref 3.5–5.2)
ALBUMIN/GLOB SERPL: 0.9 G/DL
ALP SERPL-CCNC: 101 U/L (ref 39–117)
ALT SERPL W P-5'-P-CCNC: 53 U/L (ref 1–33)
ANION GAP SERPL CALCULATED.3IONS-SCNC: 9 MMOL/L (ref 5–15)
AST SERPL-CCNC: 45 U/L (ref 1–32)
BILIRUB SERPL-MCNC: 0.5 MG/DL (ref 0–1.2)
BUN SERPL-MCNC: 16 MG/DL (ref 8–23)
BUN/CREAT SERPL: 13.9 (ref 7–25)
CALCIUM SPEC-SCNC: 8.9 MG/DL (ref 8.6–10.5)
CHLORIDE SERPL-SCNC: 104 MMOL/L (ref 98–107)
CO2 SERPL-SCNC: 27 MMOL/L (ref 22–29)
CREAT SERPL-MCNC: 1.15 MG/DL (ref 0.57–1)
DEPRECATED RDW RBC AUTO: 45.8 FL (ref 37–54)
ERYTHROCYTE [DISTWIDTH] IN BLOOD BY AUTOMATED COUNT: 13.3 % (ref 12.3–15.4)
GFR SERPL CREATININE-BSD FRML MDRD: 45 ML/MIN/1.73
GLOBULIN UR ELPH-MCNC: 3.5 GM/DL
GLUCOSE SERPL-MCNC: 89 MG/DL (ref 65–99)
HCT VFR BLD AUTO: 37.7 % (ref 34–46.6)
HGB BLD-MCNC: 12 G/DL (ref 12–15.9)
MCH RBC QN AUTO: 29.8 PG (ref 26.6–33)
MCHC RBC AUTO-ENTMCNC: 31.8 G/DL (ref 31.5–35.7)
MCV RBC AUTO: 93.5 FL (ref 79–97)
PLATELET # BLD AUTO: 159 10*3/MM3 (ref 140–450)
PMV BLD AUTO: 12.1 FL (ref 6–12)
POTASSIUM SERPL-SCNC: 4 MMOL/L (ref 3.5–5.2)
PROT SERPL-MCNC: 6.8 G/DL (ref 6–8.5)
QT INTERVAL: 430 MS
QTC INTERVAL: 432 MS
RBC # BLD AUTO: 4.03 10*6/MM3 (ref 3.77–5.28)
SODIUM SERPL-SCNC: 140 MMOL/L (ref 136–145)
WBC # BLD AUTO: 6.09 10*3/MM3 (ref 3.4–10.8)

## 2021-05-17 PROCEDURE — G0378 HOSPITAL OBSERVATION PER HR: HCPCS

## 2021-05-17 PROCEDURE — 25010000002 PIPERACILLIN SOD-TAZOBACTAM PER 1 G: Performed by: SURGERY

## 2021-05-17 PROCEDURE — 25010000002 HEPARIN (PORCINE) PER 1000 UNITS: Performed by: SURGERY

## 2021-05-17 PROCEDURE — 99217 PR OBSERVATION CARE DISCHARGE MANAGEMENT: CPT | Performed by: PHYSICIAN ASSISTANT

## 2021-05-17 PROCEDURE — 80053 COMPREHEN METABOLIC PANEL: CPT | Performed by: SURGERY

## 2021-05-17 PROCEDURE — 85027 COMPLETE CBC AUTOMATED: CPT | Performed by: SURGERY

## 2021-05-17 PROCEDURE — A9270 NON-COVERED ITEM OR SERVICE: HCPCS | Performed by: SURGERY

## 2021-05-17 PROCEDURE — 63710000001 ATENOLOL 25 MG TABLET: Performed by: SURGERY

## 2021-05-17 RX ORDER — DOCUSATE SODIUM 100 MG/1
100 CAPSULE, LIQUID FILLED ORAL 2 TIMES DAILY
Qty: 20 CAPSULE | Refills: 0 | Status: SHIPPED | OUTPATIENT
Start: 2021-05-17 | End: 2022-07-27

## 2021-05-17 RX ORDER — OXYCODONE HYDROCHLORIDE AND ACETAMINOPHEN 5; 325 MG/1; MG/1
2 TABLET ORAL EVERY 4 HOURS PRN
Qty: 17 TABLET | Refills: 0 | OUTPATIENT
Start: 2021-05-17 | End: 2022-03-06

## 2021-05-17 RX ORDER — ONDANSETRON 4 MG/1
4 TABLET, FILM COATED ORAL EVERY 6 HOURS PRN
Qty: 12 TABLET | Refills: 0 | OUTPATIENT
Start: 2021-05-17 | End: 2022-03-06

## 2021-05-17 RX ORDER — AMOXICILLIN AND CLAVULANATE POTASSIUM 500; 125 MG/1; MG/1
1 TABLET, FILM COATED ORAL 3 TIMES DAILY
Qty: 15 TABLET | Refills: 0 | OUTPATIENT
Start: 2021-05-17 | End: 2022-03-06

## 2021-05-17 RX ADMIN — TAZOBACTAM SODIUM AND PIPERACILLIN SODIUM 3.38 G: 375; 3 INJECTION, SOLUTION INTRAVENOUS at 03:18

## 2021-05-17 RX ADMIN — HEPARIN SODIUM 5000 UNITS: 5000 INJECTION, SOLUTION INTRAVENOUS; SUBCUTANEOUS at 05:33

## 2021-05-17 RX ADMIN — ATENOLOL 25 MG: 25 TABLET ORAL at 09:29

## 2021-05-18 LAB
CYTO UR: NORMAL
LAB AP CASE REPORT: NORMAL
LAB AP CLINICAL INFORMATION: NORMAL
LAB AP DIAGNOSIS COMMENT: NORMAL
PATH REPORT.FINAL DX SPEC: NORMAL
PATH REPORT.GROSS SPEC: NORMAL

## 2021-05-20 LAB
BACTERIA SPEC AEROBE CULT: NORMAL
BACTERIA SPEC AEROBE CULT: NORMAL

## 2021-11-04 ENCOUNTER — APPOINTMENT (OUTPATIENT)
Dept: GENERAL RADIOLOGY | Facility: HOSPITAL | Age: 86
End: 2021-11-04

## 2021-11-04 ENCOUNTER — HOSPITAL ENCOUNTER (EMERGENCY)
Facility: HOSPITAL | Age: 86
Discharge: HOME | End: 2021-11-04
Attending: EMERGENCY MEDICINE | Admitting: EMERGENCY MEDICINE

## 2021-11-04 VITALS
RESPIRATION RATE: 18 BRPM | OXYGEN SATURATION: 99 % | BODY MASS INDEX: 23.9 KG/M2 | DIASTOLIC BLOOD PRESSURE: 89 MMHG | TEMPERATURE: 98.9 F | WEIGHT: 140 LBS | HEART RATE: 74 BPM | HEIGHT: 64 IN | SYSTOLIC BLOOD PRESSURE: 168 MMHG

## 2021-11-04 DIAGNOSIS — U07.1 COVID-19 VIRUS INFECTION: Primary | ICD-10-CM

## 2021-11-04 LAB
ALBUMIN SERPL-MCNC: 4.15 G/DL (ref 3.5–5.2)
ALBUMIN/GLOB SERPL: 1.1 G/DL
ALP SERPL-CCNC: 106 U/L (ref 39–117)
ALT SERPL W P-5'-P-CCNC: 20 U/L (ref 1–33)
ANION GAP SERPL CALCULATED.3IONS-SCNC: 12.7 MMOL/L (ref 5–15)
AST SERPL-CCNC: 18 U/L (ref 1–32)
BASOPHILS # BLD AUTO: 0.02 10*3/MM3 (ref 0–0.2)
BASOPHILS NFR BLD AUTO: 0.4 % (ref 0–1.5)
BILIRUB SERPL-MCNC: 0.5 MG/DL (ref 0–1.2)
BUN SERPL-MCNC: 20 MG/DL (ref 8–23)
BUN/CREAT SERPL: 18.3 (ref 7–25)
CALCIUM SPEC-SCNC: 9.2 MG/DL (ref 8.6–10.5)
CHLORIDE SERPL-SCNC: 105 MMOL/L (ref 98–107)
CO2 SERPL-SCNC: 21.3 MMOL/L (ref 22–29)
CREAT SERPL-MCNC: 1.09 MG/DL (ref 0.57–1)
CRP SERPL-MCNC: 3.59 MG/DL (ref 0–0.5)
DEPRECATED RDW RBC AUTO: 43 FL (ref 37–54)
EOSINOPHIL # BLD AUTO: 0.01 10*3/MM3 (ref 0–0.4)
EOSINOPHIL NFR BLD AUTO: 0.2 % (ref 0.3–6.2)
ERYTHROCYTE [DISTWIDTH] IN BLOOD BY AUTOMATED COUNT: 13.2 % (ref 12.3–15.4)
FERRITIN SERPL-MCNC: 547.4 NG/ML (ref 13–150)
FLUAV SUBTYP SPEC NAA+PROBE: NOT DETECTED
FLUBV RNA ISLT QL NAA+PROBE: NOT DETECTED
GFR SERPL CREATININE-BSD FRML MDRD: 48 ML/MIN/1.73
GLOBULIN UR ELPH-MCNC: 3.8 GM/DL
GLUCOSE SERPL-MCNC: 112 MG/DL (ref 65–99)
HCT VFR BLD AUTO: 41.2 % (ref 34–46.6)
HGB BLD-MCNC: 13.6 G/DL (ref 12–15.9)
HOLD SPECIMEN: NORMAL
HOLD SPECIMEN: NORMAL
IMM GRANULOCYTES # BLD AUTO: 0.03 10*3/MM3 (ref 0–0.05)
IMM GRANULOCYTES NFR BLD AUTO: 0.6 % (ref 0–0.5)
LDH SERPL-CCNC: 237 U/L (ref 135–214)
LYMPHOCYTES # BLD AUTO: 0.92 10*3/MM3 (ref 0.7–3.1)
LYMPHOCYTES NFR BLD AUTO: 17.6 % (ref 19.6–45.3)
MCH RBC QN AUTO: 29.6 PG (ref 26.6–33)
MCHC RBC AUTO-ENTMCNC: 33 G/DL (ref 31.5–35.7)
MCV RBC AUTO: 89.8 FL (ref 79–97)
MONOCYTES # BLD AUTO: 0.37 10*3/MM3 (ref 0.1–0.9)
MONOCYTES NFR BLD AUTO: 7.1 % (ref 5–12)
NEUTROPHILS NFR BLD AUTO: 3.89 10*3/MM3 (ref 1.7–7)
NEUTROPHILS NFR BLD AUTO: 74.1 % (ref 42.7–76)
NRBC BLD AUTO-RTO: 0 /100 WBC (ref 0–0.2)
PLATELET # BLD AUTO: 199 10*3/MM3 (ref 140–450)
PMV BLD AUTO: 11.7 FL (ref 6–12)
POTASSIUM SERPL-SCNC: 4.3 MMOL/L (ref 3.5–5.2)
PROT SERPL-MCNC: 7.9 G/DL (ref 6–8.5)
RBC # BLD AUTO: 4.59 10*6/MM3 (ref 3.77–5.28)
SARS-COV-2 RNA PNL SPEC NAA+PROBE: DETECTED
SODIUM SERPL-SCNC: 139 MMOL/L (ref 136–145)
WBC # BLD AUTO: 5.24 10*3/MM3 (ref 3.4–10.8)
WHOLE BLOOD HOLD SPECIMEN: NORMAL
WHOLE BLOOD HOLD SPECIMEN: NORMAL

## 2021-11-04 PROCEDURE — 80053 COMPREHEN METABOLIC PANEL: CPT | Performed by: PHYSICIAN ASSISTANT

## 2021-11-04 PROCEDURE — 85025 COMPLETE CBC W/AUTO DIFF WBC: CPT | Performed by: PHYSICIAN ASSISTANT

## 2021-11-04 PROCEDURE — 71045 X-RAY EXAM CHEST 1 VIEW: CPT

## 2021-11-04 PROCEDURE — 82728 ASSAY OF FERRITIN: CPT | Performed by: PHYSICIAN ASSISTANT

## 2021-11-04 PROCEDURE — 83615 LACTATE (LD) (LDH) ENZYME: CPT | Performed by: PHYSICIAN ASSISTANT

## 2021-11-04 PROCEDURE — 87636 SARSCOV2 & INF A&B AMP PRB: CPT | Performed by: PHYSICIAN ASSISTANT

## 2021-11-04 PROCEDURE — 99283 EMERGENCY DEPT VISIT LOW MDM: CPT

## 2021-11-04 PROCEDURE — 71045 X-RAY EXAM CHEST 1 VIEW: CPT | Performed by: RADIOLOGY

## 2021-11-04 PROCEDURE — 86140 C-REACTIVE PROTEIN: CPT | Performed by: PHYSICIAN ASSISTANT

## 2021-11-04 NOTE — DISCHARGE INSTRUCTIONS
Must be quarantined for 10 days secondary to COVID-19 infection. Should you have worsening symptoms, recommend that you return to the ER for further evaluation and treatment.

## 2021-11-04 NOTE — ED NOTES
Called patient son at [phone]. He states that he will be on his way to pick her up.      Kateryna So RN  11/04/21 1071

## 2021-11-04 NOTE — ED PROVIDER NOTES
Subjective   86-year-old female with past medical history of hypertension, DVT, and arthritis presents to the emergency room with a nonproductive cough, sore throat, and diarrhea for the past 2 weeks.  She denies being around any sick contacts or travel.  She is not vaccinated against Covid.  She denies any chest pain, shortness of breath, back pain, abdominal pain, or dysuria.  She denies any specific aggravating or alleviating factors.  Denies any other complaints or concerns at this time.      History provided by:  Patient   used: No        Review of Systems   Constitutional: Negative.  Negative for fever.   HENT: Positive for sore throat.    Respiratory: Positive for cough.    Cardiovascular: Negative.  Negative for chest pain.   Gastrointestinal: Positive for diarrhea. Negative for abdominal pain.   Endocrine: Negative.    Genitourinary: Negative.  Negative for dysuria.   Skin: Negative.    Neurological: Negative.    Psychiatric/Behavioral: Negative.    All other systems reviewed and are negative.      Past Medical History:   Diagnosis Date   • Arthritis    • DVT (deep venous thrombosis) (CMS/HCC)    • Hypertension        No Known Allergies    Past Surgical History:   Procedure Laterality Date   • CHOLECYSTECTOMY WITH INTRAOPERATIVE CHOLANGIOGRAM N/A 5/16/2021    Procedure: CHOLECYSTECTOMY LAPAROSCOPIC INTRAOPERATIVE CHOLANGIOGRAM;  Surgeon: Timo Monet MD;  Location: [facility];  Service: General;  Laterality: N/A;   • HYSTERECTOMY     • LEG SURGERY Left        No family history on file.    Social History     Socioeconomic History   • Marital status:    Tobacco Use   • Smoking status: Never Smoker   • Smokeless tobacco: Never Used   Substance and Sexual Activity   • Alcohol use: No   • Drug use: No   • Sexual activity: Defer           Objective   Physical Exam  Vitals and nursing note reviewed.   Constitutional:       General: She is not in acute distress.     Appearance: She  is well-developed. She is not diaphoretic.   HENT:      Head: Normocephalic and atraumatic.      Right Ear: External ear normal.      Left Ear: External ear normal.      Nose: Nose normal.   Eyes:      Conjunctiva/sclera: Conjunctivae normal.      Pupils: Pupils are equal, round, and reactive to light.   Neck:      Vascular: No JVD.      Trachea: No tracheal deviation.   Cardiovascular:      Rate and Rhythm: Normal rate and regular rhythm.      Heart sounds: Normal heart sounds. No murmur heard.      Pulmonary:      Effort: Pulmonary effort is normal. No respiratory distress.      Breath sounds: Normal breath sounds. No wheezing.   Abdominal:      General: Bowel sounds are normal.      Palpations: Abdomen is soft.      Tenderness: There is no abdominal tenderness.   Musculoskeletal:         General: No deformity. Normal range of motion.      Cervical back: Normal range of motion and neck supple.   Skin:     General: Skin is warm and dry.      Coloration: Skin is not pale.      Findings: No erythema or rash.   Neurological:      Mental Status: She is alert and oriented to person, place, and time.      Cranial Nerves: No cranial nerve deficit.   Psychiatric:         Behavior: Behavior normal.         Thought Content: Thought content normal.         Procedures           ED Course  ED Course as of 11/04/21 1744   Thu Nov 04, 2021   1408 COVID19(!!): Detected [TK]   1540 XR Chest 1 View [TK]   1601 Discussed workup with findings with patient. She states she does not believe that she has COVID and is not willing to take any infusion, which was offered to her. She is adamant about contacting her son now to come pick her up. [TK]      ED Course User Index  [TK] Rosemarie Isaacs, MARCELLO                                           MDM  Number of Diagnoses or Management Options  COVID-19 virus infection: new and requires workup     Amount and/or Complexity of Data Reviewed  Clinical lab tests: reviewed and ordered  Tests in the  radiology section of CPT®: reviewed and ordered    Risk of Complications, Morbidity, and/or Mortality  Presenting problems: moderate  Diagnostic procedures: moderate  Management options: minimal    Patient Progress  Patient progress: stable      Final diagnoses:   COVID-19 virus infection       ED Disposition  ED Disposition     ED Disposition Condition Comment    Discharge Stable           Federico Dave, APRN  [address]  [facility] [address]  [phone]    In 2 days           Medication List      No changes were made to your prescriptions during this visit.          Rosemarie Isaacs PAGiaC  11/04/21 1744

## 2022-02-20 ENCOUNTER — APPOINTMENT (OUTPATIENT)
Dept: CARDIOLOGY | Facility: HOSPITAL | Age: 87
End: 2022-02-20

## 2022-02-20 ENCOUNTER — HOSPITAL ENCOUNTER (EMERGENCY)
Facility: HOSPITAL | Age: 87
Discharge: HOME | End: 2022-02-20
Attending: EMERGENCY MEDICINE | Admitting: EMERGENCY MEDICINE

## 2022-02-20 VITALS
DIASTOLIC BLOOD PRESSURE: 80 MMHG | RESPIRATION RATE: 18 BRPM | HEART RATE: 62 BPM | HEIGHT: 64 IN | WEIGHT: 147 LBS | OXYGEN SATURATION: 96 % | TEMPERATURE: 98.2 F | SYSTOLIC BLOOD PRESSURE: 162 MMHG | BODY MASS INDEX: 25.1 KG/M2

## 2022-02-20 DIAGNOSIS — M79.661 RIGHT CALF PAIN: ICD-10-CM

## 2022-02-20 DIAGNOSIS — M79.604 RIGHT LEG PAIN: Primary | ICD-10-CM

## 2022-02-20 LAB
BH CV LOWER VASCULAR LEFT COMMON FEMORAL AUGMENT: NORMAL
BH CV LOWER VASCULAR LEFT COMMON FEMORAL COMPETENT: NORMAL
BH CV LOWER VASCULAR LEFT COMMON FEMORAL COMPRESS: NORMAL
BH CV LOWER VASCULAR LEFT COMMON FEMORAL PHASIC: NORMAL
BH CV LOWER VASCULAR LEFT COMMON FEMORAL SPONT: NORMAL
BH CV LOWER VASCULAR RIGHT COMMON FEMORAL AUGMENT: NORMAL
BH CV LOWER VASCULAR RIGHT COMMON FEMORAL COMPETENT: NORMAL
BH CV LOWER VASCULAR RIGHT COMMON FEMORAL COMPRESS: NORMAL
BH CV LOWER VASCULAR RIGHT COMMON FEMORAL PHASIC: NORMAL
BH CV LOWER VASCULAR RIGHT COMMON FEMORAL SPONT: NORMAL
BH CV LOWER VASCULAR RIGHT DISTAL FEMORAL AUGMENT: NORMAL
BH CV LOWER VASCULAR RIGHT DISTAL FEMORAL COMPETENT: NORMAL
BH CV LOWER VASCULAR RIGHT DISTAL FEMORAL COMPRESS: NORMAL
BH CV LOWER VASCULAR RIGHT DISTAL FEMORAL PHASIC: NORMAL
BH CV LOWER VASCULAR RIGHT DISTAL FEMORAL SPONT: NORMAL
BH CV LOWER VASCULAR RIGHT GASTRONEMIUS COMPRESS: NORMAL
BH CV LOWER VASCULAR RIGHT GREATER SAPH AK COMPRESS: NORMAL
BH CV LOWER VASCULAR RIGHT GREATER SAPH BK COMPRESS: NORMAL
BH CV LOWER VASCULAR RIGHT LESSER SAPH COMPRESS: NORMAL
BH CV LOWER VASCULAR RIGHT MID FEMORAL AUGMENT: NORMAL
BH CV LOWER VASCULAR RIGHT MID FEMORAL COMPETENT: NORMAL
BH CV LOWER VASCULAR RIGHT MID FEMORAL COMPRESS: NORMAL
BH CV LOWER VASCULAR RIGHT MID FEMORAL PHASIC: NORMAL
BH CV LOWER VASCULAR RIGHT MID FEMORAL SPONT: NORMAL
BH CV LOWER VASCULAR RIGHT PERONEAL AUGMENT: NORMAL
BH CV LOWER VASCULAR RIGHT PERONEAL COMPRESS: NORMAL
BH CV LOWER VASCULAR RIGHT POPLITEAL AUGMENT: NORMAL
BH CV LOWER VASCULAR RIGHT POPLITEAL COMPETENT: NORMAL
BH CV LOWER VASCULAR RIGHT POPLITEAL COMPRESS: NORMAL
BH CV LOWER VASCULAR RIGHT POPLITEAL PHASIC: NORMAL
BH CV LOWER VASCULAR RIGHT POPLITEAL SPONT: NORMAL
BH CV LOWER VASCULAR RIGHT POSTERIOR TIBIAL AUGMENT: NORMAL
BH CV LOWER VASCULAR RIGHT POSTERIOR TIBIAL COMPRESS: NORMAL
BH CV LOWER VASCULAR RIGHT PROFUNDA FEMORAL AUGMENT: NORMAL
BH CV LOWER VASCULAR RIGHT PROFUNDA FEMORAL COMPETENT: NORMAL
BH CV LOWER VASCULAR RIGHT PROFUNDA FEMORAL COMPRESS: NORMAL
BH CV LOWER VASCULAR RIGHT PROFUNDA FEMORAL PHASIC: NORMAL
BH CV LOWER VASCULAR RIGHT PROFUNDA FEMORAL SPONT: NORMAL
BH CV LOWER VASCULAR RIGHT PROXIMAL FEMORAL AUGMENT: NORMAL
BH CV LOWER VASCULAR RIGHT PROXIMAL FEMORAL COMPETENT: NORMAL
BH CV LOWER VASCULAR RIGHT PROXIMAL FEMORAL COMPRESS: NORMAL
BH CV LOWER VASCULAR RIGHT PROXIMAL FEMORAL PHASIC: NORMAL
BH CV LOWER VASCULAR RIGHT PROXIMAL FEMORAL SPONT: NORMAL
BH CV LOWER VASCULAR RIGHT SAPHENOFEMORAL JUNCTION AUGMENT: NORMAL
BH CV LOWER VASCULAR RIGHT SAPHENOFEMORAL JUNCTION COMPETENT: NORMAL
BH CV LOWER VASCULAR RIGHT SAPHENOFEMORAL JUNCTION COMPRESS: NORMAL
BH CV LOWER VASCULAR RIGHT SAPHENOFEMORAL JUNCTION PHASIC: NORMAL
BH CV LOWER VASCULAR RIGHT SAPHENOFEMORAL JUNCTION SPONT: NORMAL
MAXIMAL PREDICTED HEART RATE: 136 BPM
STRESS TARGET HR: 116 BPM

## 2022-02-20 PROCEDURE — 93971 EXTREMITY STUDY: CPT

## 2022-02-20 PROCEDURE — 99282 EMERGENCY DEPT VISIT SF MDM: CPT

## 2022-02-20 PROCEDURE — 93971 EXTREMITY STUDY: CPT | Performed by: INTERNAL MEDICINE

## 2022-02-20 NOTE — ED PROVIDER NOTES
Subjective   Patient presents the ER with a prescription for a venous Doppler for further evaluation of right leg pain.  She tells me she is seen in the ER last night [place] and had an elevated D-dimer and it was recommended that she get a Doppler but that did not have one available and so she was advised to seek a different hospital.  She denies any chest pain or difficulty breathing.  She tells me she had a DVT in her opposite leg several years ago.  And she was given shots.  She is only anticoagulated on aspirin. No trauma. No fever.      Leg Pain  Location:  Leg  Time since incident: 4 days.  Injury: no    Leg location:  R leg  Pain details:     Quality:  Aching and cramping    Radiates to:  Does not radiate    Severity:  Mild    Onset quality:  Gradual    Timing:  Constant    Progression:  Unchanged  Chronicity:  New  Foreign body present:  No foreign bodies  Prior injury to area:  No  Relieved by:  Immobilization  Exacerbated by: nothing.  Associated symptoms: no fever    Risk factors: no concern for non-accidental trauma        Review of Systems   Constitutional: Negative for chills, diaphoresis and fever.   HENT: Negative for congestion and sore throat.    Respiratory: Negative for cough, choking, chest tightness, shortness of breath and wheezing.    Cardiovascular: Negative for chest pain and leg swelling.   Gastrointestinal: Negative for abdominal distention, abdominal pain, anal bleeding, blood in stool, constipation, diarrhea, nausea and vomiting.   Genitourinary: Negative for difficulty urinating, dysuria, flank pain, frequency, hematuria and urgency.   All other systems reviewed and are negative.      Past Medical History:   Diagnosis Date   • Arthritis    • DVT (deep venous thrombosis) (CMS/HCC)    • Hypertension        No Known Allergies    Past Surgical History:   Procedure Laterality Date   • CHOLECYSTECTOMY WITH INTRAOPERATIVE CHOLANGIOGRAM N/A 5/16/2021    Procedure: CHOLECYSTECTOMY  LAPAROSCOPIC INTRAOPERATIVE CHOLANGIOGRAM;  Surgeon: Timo Monet MD;  Location: [facility];  Service: General;  Laterality: N/A;   • HYSTERECTOMY     • LEG SURGERY Left        No family history on file.    Social History     Socioeconomic History   • Marital status:    Tobacco Use   • Smoking status: Never Smoker   • Smokeless tobacco: Never Used   Substance and Sexual Activity   • Alcohol use: No   • Drug use: No   • Sexual activity: Defer           Objective   Physical Exam  Constitutional:       Appearance: She is well-developed.   HENT:      Head: Normocephalic and atraumatic.      Right Ear: External ear normal.      Left Ear: External ear normal.      Nose: Nose normal.   Eyes:      Conjunctiva/sclera: Conjunctivae normal.      Pupils: Pupils are equal, round, and reactive to light.   Cardiovascular:      Rate and Rhythm: Normal rate and regular rhythm.      Heart sounds: Normal heart sounds.   Pulmonary:      Effort: Pulmonary effort is normal.      Breath sounds: Normal breath sounds.   Abdominal:      General: Bowel sounds are normal.      Palpations: Abdomen is soft.   Musculoskeletal:         General: Normal range of motion.      Cervical back: Normal range of motion and neck supple.      Comments: Strong pulses right left lower extremity.  Soft compartments.  No evidence of cellulitis or erythema.  She does have some mild pain in her right posterior calf and hamstring.  Her knee also looks reassuring good passive range of motion without pain.   Skin:     General: Skin is warm and dry.   Neurological:      Mental Status: She is alert and oriented to person, place, and time.   Psychiatric:         Behavior: Behavior normal.         Thought Content: Thought content normal.         Judgment: Judgment normal.         Procedures           ED Course  ED Course as of 02/20/22 1535   Sun Feb 20, 2022   1531 Preliminary Doppler negative for DVT.  Strong pulses soft compartments.  Patient well aware  the signs worsens.  All thankful and agreeable.  She denies any chest pain.  She denies any difficulty breathing.  She is not tachycardic or hypoxic. [JM]      ED Course User Index  [OLIVIA] Alfredito Weinstein APRN                                                 [facility]    Final diagnoses:   Right leg pain   Right calf pain       ED Disposition  ED Disposition     ED Disposition Condition Comment    Discharge Stable           Federico Dave, APRN  [address]  [facility] [address]  [phone]    Schedule an appointment as soon as possible for a visit       [facility] Emergency Department  [facility] [address] [phone]    If symptoms worsen         Medication List      No changes were made to your prescriptions during this visit.          Alfredito Weinstein, MYRON  02/20/22 0830

## 2022-02-20 NOTE — DISCHARGE INSTRUCTIONS
Your pulmonary venous Doppler today was negative for DVT.  Please touch base with your regular doctor for further evaluation this week and repeat testing if you have continued or worsening pain.

## 2022-03-06 ENCOUNTER — APPOINTMENT (OUTPATIENT)
Dept: GENERAL RADIOLOGY | Facility: HOSPITAL | Age: 87
End: 2022-03-06

## 2022-03-06 ENCOUNTER — HOSPITAL ENCOUNTER (EMERGENCY)
Facility: HOSPITAL | Age: 87
Discharge: HOME | End: 2022-03-06
Attending: EMERGENCY MEDICINE | Admitting: EMERGENCY MEDICINE

## 2022-03-06 VITALS
HEART RATE: 98 BPM | TEMPERATURE: 98.1 F | HEIGHT: 64 IN | DIASTOLIC BLOOD PRESSURE: 95 MMHG | SYSTOLIC BLOOD PRESSURE: 115 MMHG | WEIGHT: 147 LBS | RESPIRATION RATE: 16 BRPM | BODY MASS INDEX: 25.1 KG/M2 | OXYGEN SATURATION: 95 %

## 2022-03-06 DIAGNOSIS — M51.36 DDD (DEGENERATIVE DISC DISEASE), LUMBAR: Primary | ICD-10-CM

## 2022-03-06 DIAGNOSIS — M54.31 SCIATICA OF RIGHT SIDE: ICD-10-CM

## 2022-03-06 LAB
BACTERIA UR QL AUTO: ABNORMAL /HPF
BILIRUB UR QL STRIP: NEGATIVE
CLARITY UR: CLEAR
COLOR UR: YELLOW
GLUCOSE UR STRIP-MCNC: NEGATIVE MG/DL
HGB UR QL STRIP.AUTO: NEGATIVE
HYALINE CASTS UR QL AUTO: ABNORMAL /LPF
KETONES UR QL STRIP: NEGATIVE
LEUKOCYTE ESTERASE UR QL STRIP.AUTO: ABNORMAL
NITRITE UR QL STRIP: NEGATIVE
PH UR STRIP.AUTO: 5.5 [PH] (ref 5–8)
PROT UR QL STRIP: NEGATIVE
RBC # UR STRIP: ABNORMAL /HPF
REF LAB TEST METHOD: ABNORMAL
SP GR UR STRIP: 1.01 (ref 1–1.03)
SQUAMOUS #/AREA URNS HPF: ABNORMAL /HPF
UROBILINOGEN UR QL STRIP: ABNORMAL
WBC # UR STRIP: ABNORMAL /HPF

## 2022-03-06 PROCEDURE — 72100 X-RAY EXAM L-S SPINE 2/3 VWS: CPT

## 2022-03-06 PROCEDURE — 87086 URINE CULTURE/COLONY COUNT: CPT | Performed by: NURSE PRACTITIONER

## 2022-03-06 PROCEDURE — 81001 URINALYSIS AUTO W/SCOPE: CPT | Performed by: EMERGENCY MEDICINE

## 2022-03-06 PROCEDURE — 99283 EMERGENCY DEPT VISIT LOW MDM: CPT

## 2022-03-06 PROCEDURE — 87077 CULTURE AEROBIC IDENTIFY: CPT | Performed by: NURSE PRACTITIONER

## 2022-03-06 PROCEDURE — 87186 SC STD MICRODIL/AGAR DIL: CPT | Performed by: NURSE PRACTITIONER

## 2022-03-06 RX ORDER — NAPROXEN SODIUM 220 MG
220 TABLET ORAL 2 TIMES DAILY PRN
COMMUNITY
End: 2022-06-10 | Stop reason: DRUGHIGH

## 2022-03-06 RX ORDER — TIZANIDINE 2 MG/1
2 TABLET ORAL NIGHTLY PRN
Qty: 10 TABLET | Refills: 0 | Status: SHIPPED | OUTPATIENT
Start: 2022-03-06 | End: 2022-06-10

## 2022-03-06 RX ORDER — TRAMADOL HYDROCHLORIDE 50 MG/1
50 TABLET ORAL ONCE
Status: COMPLETED | OUTPATIENT
Start: 2022-03-06 | End: 2022-03-06

## 2022-03-06 RX ADMIN — TRAMADOL HYDROCHLORIDE 50 MG: 50 TABLET, COATED ORAL at 15:00

## 2022-03-06 NOTE — EXTERNAL PATIENT INSTRUCTIONS
Patient Education   Table of Contents       Degenerative Disk Disease       Sciatica     To view videos and all your education online visit,   [email or web address]   or scan this QR code with your smartphone.                  Degenerative Disk Disease        Degenerative disk disease is a condition caused by changes that occur in the spinal disks as a person ages. Spinal disks are soft and compressible disks located between the bones of your spine (vertebrae). These disks act like shock absorbers.    Degenerative disk disease can affect the whole spine. However, the neck and lower back are most often affected. Many changes can occur in the spinal disks with aging, such as:       The spinal disks may dry and shrink.       Small tears may occur in the tough, outer covering of the disk (annulus).       The disk space may become smaller due to loss of water.       Abnormal growths in the bone (spurs) may occur. This can put pressure on the nerve roots exiting the spinal canal, causing pain.       The spinal canal may become narrowed.       What are the causes?    This condition may be caused by:       Normal degeneration with age.       Injuries.       Certain activities and sports that cause damage.     What increases the risk?    The following factors may make you more likely to develop this condition:       Being overweight.       Having a family history of degenerative disk disease.       Smoking and use of products that contain nicotine and tobacco.       Sudden injury.       Doing work that requires heavy lifting.     What are the signs or symptoms?    Symptoms of this condition include:      Pain that varies in intensity. Some people have no pain, while others have severe pain. The location of the pain depends on the part of your backbone that is affected. You may have:       Pain in your neck or arm if a disk in your neck area is affected.       Pain in your back, buttocks, or legs if a disk in your  lower back is affected.       Pain that becomes worse while bending or reaching up, or with twisting movements.       Pain that may start gradually and worsen as time passes. It may also start after a major or minor injury.       Numbness or tingling in the arms or legs.     How is this diagnosed?    This condition may be diagnosed based on:       Your symptoms and medical history.       A physical exam.      Imaging tests, including:       X-ray of the spine.       CT scan.       MRI.     How is this treated?    This condition may be treated with:       Medicines.       Injection of steroids into the back.       Rehabilitation exercises. These activities aim to strengthen muscles in your back and abdomen to better support your spine.     If treatments do not help to relieve your symptoms or you have severe pain, you may need surgery.   Follow these instructions at home:   Medicines         Take over-the-counter and prescription medicines only as told by your health care provider.      Ask your health care provider if the medicine prescribed to you:       Requires you to avoid driving or using machinery.      Can cause constipation. You may need to take these actions to prevent or treat constipation:       Drink enough fluid to keep your urine pale yellow.       Take over-the-counter or prescription medicines.       Eat foods that are high in fiber, such as beans, whole grains, and fresh fruits and vegetables.       Limit foods that are high in fat and processed sugars, such as fried or sweet foods.         Activity         Rest as told by your health care provider.       Avoid sitting for a long time without moving. Get up to take short walks every 1?2 hours. This is important to improve blood flow and breathing. Ask for help if you feel weak or unsteady.       Return to your normal activities as told by your health care provider. Ask your health care provider what activities are safe for you.       Perform  relaxation exercises as told by your health care provider.       Maintain good posture.      Do not  lift anything that is heavier than 10 lb (4.5 kg), or the limit that you are told, until your health care provider says that it is safe.       Follow proper lifting and walking techniques as told by your health care provider.     Managing pain, stiffness, and swelling              If directed, put ice on the painful area. Icing can help to relieve pain. To do this:       Put ice in a plastic bag.       Place a towel between your skin and the bag.       Leave the ice on for 20 minutes, 2?3 times a day.       Remove the ice if your skin turns bright red. This is very important. If you cannot feel pain, heat, or cold, you have a greater risk of damage to the area.      If directed, apply heat to the painful area as often as told by your health care provider. Heat can reduce the stiffness of your muscles. Use the heat source that your health care provider recommends, such as a moist heat pack or a heating pad.       Place a towel between your skin and the heat source.       Leave the heat on for 20?30 minutes.       Remove the heat if your skin turns bright red. This is especially important if you are unable to feel pain, heat, or cold. You may have a greater risk of getting burned.       General instructions         Change your sitting, standing, and sleeping habits as told by your health care provider.       Avoid sitting in the same position for long periods of time. Change positions frequently.       Lose weight or maintain a healthy weight as told by your health care provider.      Do not  use any products that contain nicotine or tobacco, such as cigarettes, e-cigarettes, and chewing tobacco. If you need help quitting, ask your health care provider.       Wear supportive footwear.       Keep all follow-up visits. This is important. This may include visits for physical therapy.       Contact a health care provider  if you:         Have pain that does not go away within 1?4 weeks.       Lose your appetite.       Lose weight without trying.     Get help right away if you:         Have severe pain.       Notice weakness in your arms, hands, or legs.       Begin to lose control of your bladder or bowel movements.       Have fevers or night sweats.     Summary         Degenerative disk disease is a condition caused by changes that occur in the spinal disks as a person ages.       This condition can affect the whole spine. However, the neck and lower back are most often affected.       Take over-the-counter and prescription medicines only as told by your health care provider.     This information is not intended to replace advice given to you by your health care provider. Make sure you discuss any questions you have with your health care provider.     Document Released: 10/14/2008Document Revised: 04/01/2021Document Reviewed: 04/01/2021     Elsevier Patient Education ? 2021 [employer]         Sciatica        Sciatica is pain, weakness, tingling, or loss of feeling (numbness) along the sciatic nerve. The sciatic nerve starts in the lower back and goes down the back of each leg. Sciatica usually goes away on its own or with treatment. Sometimes, sciatica may come back (recur).     What are the causes?    This condition happens when the sciatic nerve is pinched or has pressure put on it. This may be the result of:       A disk in between the bones of the spine bulging out too far (herniated disk).       Changes in the spinal disks that occur with aging.       A condition that affects a muscle in the butt.       Extra bone growth near the sciatic nerve.       A break (fracture) of the area between your hip bones (pelvis).       Pregnancy.       Tumor. This is rare.     What increases the risk?    You are more likely to develop this condition if you:       Play sports that put pressure or stress on the spine.       Have poor strength  and ease of movement (flexibility).       Have had a back injury in the past.       Have had back surgery.       Sit for long periods of time.       Do activities that involve bending or lifting over and over again.       Are very overweight (obese).     What are the signs or symptoms?    Symptoms can vary from mild to very bad. They may include:      Any of these problems in the lower back, leg, hip, or butt:       Mild tingling, loss of feeling, or dull aches.       Burning sensations.       Sharp pains.       Loss of feeling in the back of the calf or the sole of the foot.       Leg weakness.       Very bad back pain that makes it hard to move.     These symptoms may get worse when you cough, sneeze, or laugh. They may also get worse when you sit or stand for long periods of time.   How is this treated?    This condition often gets better without any treatment. However, treatment may include:       Changing or cutting back on physical activity when you have pain.       Doing exercises and stretching.       Putting ice or heat on the affected area.      Medicines that help:       To relieve pain and swelling.       To relax your muscles.       Shots (injections) of medicines that help to relieve pain, irritation, and swelling.       Surgery.     Follow these instructions at home:   Medicines         Take over-the-counter and prescription medicines only as told by your doctor.      Ask your doctor if the medicine prescribed to you:       Requires you to avoid driving or using heavy machinery.      Can cause trouble pooping (constipation). You may need to take these steps to prevent or treat trouble pooping:       Drink enough fluids to keep your pee (urine) pale yellow.       Take over-the-counter or prescription medicines.       Eat foods that are high in fiber. These include beans, whole grains, and fresh fruits and vegetables.       Limit foods that are high in fat and sugar. These include fried or sweet foods.          Managing pain              If told, put ice on the affected area.       Put ice in a plastic bag.       Place a towel between your skin and the bag.       Leave the ice on for 20 minutes, 2?3 times a day.      If told, put heat on the affected area. Use the heat source that your doctor tells you to use, such as a moist heat pack or a heating pad.       Place a towel between your skin and the heat source.       Leave the heat on for 20?30 minutes.       Remove the heat if your skin turns bright red. This is very important if you are unable to feel pain, heat, or cold. You may have a greater risk of getting burned.       Activity            Return to your normal activities as told by your doctor. Ask your doctor what activities are safe for you.       Avoid activities that make your symptoms worse.      Take short rests during the day.       When you rest for a long time, do some physical activity or stretching between periods of rest.       Avoid sitting for a long time without moving. Get up and move around at least one time each hour.       Exercise and stretch regularly, as told by your doctor.      Do not  lift anything that is heavier than 10 lb (4.5 kg) while you have symptoms of sciatica.       Avoid lifting heavy things even when you do not have symptoms.       Avoid lifting heavy things over and over.      When you lift objects, always lift in a way that is safe for your body. To do this, you should:       Bend your knees.       Keep the object close to your body.       Avoid twisting.       General instructions         Stay at a healthy weight.       Wear comfortable shoes that support your feet. Avoid wearing high heels.       Avoid sleeping on a mattress that is too soft or too hard. You might have less pain if you sleep on a mattress that is firm enough to support your back.       Keep all follow-up visits as told by your doctor. This is important.       Contact a doctor if:        You have pain  that:       Wakes you up when you are sleeping.       Gets worse when you lie down.       Is worse than the pain you have had in the past.       Lasts longer than 4 weeks.       You lose weight without trying.     Get help right away if:         You cannot control when you pee (urinate) or poop (have a bowel movement).      You have weakness in any of these areas and it gets worse:       Lower back.       The area between your hip bones.       Butt.       Legs.       You have redness or swelling of your back.       You have a burning feeling when you pee.     Summary         Sciatica is pain, weakness, tingling, or loss of feeling (numbness) along the sciatic nerve.       This condition happens when the sciatic nerve is pinched or has pressure put on it.       Sciatica can cause pain, tingling, or loss of feeling (numbness) in the lower back, legs, hips, and butt.       Treatment often includes rest, exercise, medicines, and putting ice or heat on the affected area.     This information is not intended to replace advice given to you by your health care provider. Make sure you discuss any questions you have with your health care provider.     Document Released: 09/26/2009Document Revised: 01/06/2020Document Reviewed: 01/06/2020     [employer] Patient Education ? 2021 [employer]

## 2022-03-06 NOTE — ED PROVIDER NOTES
Subjective   Emerita Lew is an 86 yr old male presents to the ER with c/o lower back pain.  Patient complains of pain across her lower back.  Pain radiates down into the right lower extremity.  Pain increases with movement.  Patient has been taking Aleve for the discomfort.  She denies any saddle anesthesia or any loss of bowel or bladder control.  She denies any urinary frequency, burning or urgency.  Negative for any injury.  She reports she knows she has some arthritis in her back however this pain is getting worse.      History provided by:  Patient   used: No    Back Pain  Location:  Lumbar spine  Quality:  Shooting and stabbing  Radiates to:  R posterior upper leg and R knee  Pain severity:  Moderate  Timing:  Constant  Progression:  Worsening  Worsened by:  Ambulation and movement  Associated symptoms: no bladder incontinence, no bowel incontinence, no chest pain, no dysuria, no fever, no numbness, no paresthesias, no tingling and no weakness        Review of Systems   Constitutional: Negative for chills and fever.   Respiratory: Negative for shortness of breath.    Cardiovascular: Negative for chest pain.   Gastrointestinal: Negative for bowel incontinence.   Genitourinary: Negative for bladder incontinence and dysuria.   Musculoskeletal: Positive for back pain.   Neurological: Negative for tingling, weakness, numbness and paresthesias.   All other systems reviewed and are negative.      Past Medical History:   Diagnosis Date   • Arthritis    • DVT (deep venous thrombosis) (HCC)    • Hypertension        No Known Allergies    Past Surgical History:   Procedure Laterality Date   • CHOLECYSTECTOMY WITH INTRAOPERATIVE CHOLANGIOGRAM N/A 5/16/2021    Procedure: CHOLECYSTECTOMY LAPAROSCOPIC INTRAOPERATIVE CHOLANGIOGRAM;  Surgeon: Timo Monet MD;  [facility];  Service: General;  Laterality: N/A;   • HYSTERECTOMY     • LEG SURGERY Left        History reviewed. No pertinent  family history.    Social History     Socioeconomic History   • Marital status:    Tobacco Use   • Smoking status: Never Smoker   • Smokeless tobacco: Never Used   Substance and Sexual Activity   • Alcohol use: No   • Drug use: No   • Sexual activity: Defer           Objective   Physical Exam  Vitals and nursing note reviewed.   Constitutional:       General: She is not in acute distress.     Appearance: Normal appearance. She is not ill-appearing.      Comments: Patient appears uncomfortable with movement.   HENT:      Head: Normocephalic.      Nose: Nose normal.      Mouth/Throat:      Mouth: Mucous membranes are moist.   Eyes:      Extraocular Movements: Extraocular movements intact.      Pupils: Pupils are equal, round, and reactive to light.   Cardiovascular:      Rate and Rhythm: Normal rate and regular rhythm.      Pulses: Normal pulses.      Heart sounds: Normal heart sounds.   Pulmonary:      Effort: Pulmonary effort is normal. No respiratory distress.      Breath sounds: Normal breath sounds.   Musculoskeletal:      Cervical back: Normal range of motion. No tenderness. Normal range of motion.      Thoracic back: No tenderness. Normal range of motion.      Lumbar back: Tenderness (rt paraspinal tenderness, RT SI joint tenderness) present.   Skin:     General: Skin is warm.   Neurological:      Mental Status: She is alert and oriented to person, place, and time.   Psychiatric:         Mood and Affect: Mood normal.         Procedures           ED Course  ED Course as of 03/06/22 1612   Sun Mar 06, 2022   1519 Bacteria, UA(!): 2+ [KG]   1547 Leukocytes, UA(!): Trace [KG]   1611 Results are discussed with patient at this time.  Patient encouraged to continue taking her naproxen.  Patient to add Tylenol during the day and a muscle relaxer at night.  Patient to follow-up with her PCP.  Patient agrees and verbalized understanding. [KG]      ED Course User Index  [KG] Sayra Mckeon, APRN            Recent Results (from the past 24 hour(s))   Urinalysis With Microscopic If Indicated (No Culture) - Urine, Clean Catch    Collection Time: 03/06/22  3:00 PM    Specimen: Urine, Clean Catch   Result Value Ref Range    Color, UA Yellow Yellow, Straw    Appearance, UA Clear Clear    pH, UA 5.5 5.0 - 8.0    Specific Gravity, UA 1.008 1.001 - 1.030    Glucose, UA Negative Negative    Ketones, UA Negative Negative    Bilirubin, UA Negative Negative    Blood, UA Negative Negative    Protein, UA Negative Negative    Leuk Esterase, UA Trace (A) Negative    Nitrite, UA Negative Negative    Urobilinogen, UA 0.2 E.U./dL 0.2 - 1.0 E.U./dL   Urinalysis, Microscopic Only - Urine, Clean Catch    Collection Time: 03/06/22  3:00 PM    Specimen: Urine, Clean Catch   Result Value Ref Range    RBC, UA 0-2 None Seen, 0-2 /HPF    WBC, UA 0-2 None Seen, 0-2 /HPF    Bacteria, UA 2+ (A) None Seen, Trace /HPF    Squamous Epithelial Cells, UA 3-6 (A) None Seen, 0-2 /HPF    Hyaline Casts, UA 0-6 0 - 6 /LPF    Methodology Automated Microscopy      Note: In addition to lab results from this visit, the labs listed above may include labs taken at another facility or during a different encounter within the last 24 hours. Please correlate lab times with ED admission and discharge times for further clarification of the services performed during this visit.    XR Spine Lumbar 2 or 3 View   Final Result   1.  Evidence for a remote-appearing moderate compression fracture   deformity at the L1 vertebral level. There is no associated   malalignment.   2.  No evidence for acute displaced fracture or subluxation. No   definitive acute compression fracture is seen.   3.  Advanced degenerative changes throughout the lumbar spine.       This report was finalized on 3/6/2022 3:07 PM by Michael Sommer MD.            Vitals:    03/06/22 1409   BP: 167/94   BP Location: Right arm   Patient Position: Sitting   Pulse: 68   Resp: 18   Temp: 98.1 °F (36.7 °C)  "  TempSrc: Oral   SpO2: 97%   Weight: 66.7 kg (147 lb)   Height: 162.6 cm (64\")     Medications   traMADol (ULTRAM) tablet 50 mg (50 mg Oral Given 3/6/22 1500)     ECG/EMG Results (last 24 hours)     ** No results found for the last 24 hours. **        No orders to display                                             MDM    Final diagnoses:   DDD (degenerative disc disease), lumbar   Sciatica of right side       ED Disposition  ED Disposition     ED Disposition   Discharge    Condition   Stable    Comment   --             Federico Dave, APRN  [address]  [facility] [address] [phone]          [facility]  [address] [facility] [address] [phone]    As needed         Medication List      New Prescriptions    tiZANidine 2 MG tablet  Commonly known as: ZANAFLEX  Take 1 tablet by mouth At Night As Needed for Muscle Spasms.        Stop    amoxicillin-clavulanate 500-125 MG per tablet  Commonly known as: Augmentin     ondansetron 4 MG tablet  Commonly known as: ZOFRAN     oxyCODONE-acetaminophen 5-325 MG per tablet  Commonly known as: PERCOCET           Where to Get Your Medications      These medications were sent to [facility] - [place], [place] - [address] - [phone] [facility] [phone] FX  [address], [facility] [address]    Phone: [phone]   · tiZANidine 2 MG tablet          Sayra Mckeon, APRN  03/06/22 1612    "

## 2022-03-06 NOTE — DISCHARGE INSTRUCTIONS
Continue your Aleve.  Add the muscle relaxer at night.  Take Tylenol during the day.  Follow-up with your doctor.

## 2022-03-08 LAB — BACTERIA SPEC AEROBE CULT: ABNORMAL

## 2022-03-09 ENCOUNTER — TELEPHONE (OUTPATIENT)
Dept: EMERGENCY DEPT | Facility: HOSPITAL | Age: 87
End: 2022-03-09

## 2022-06-07 PROBLEM — I21.3 STEMI (ST ELEVATION MYOCARDIAL INFARCTION): Status: ACTIVE | Noted: 2022-06-07

## 2022-06-07 PROBLEM — J44.9 COPD (CHRONIC OBSTRUCTIVE PULMONARY DISEASE): Status: ACTIVE | Noted: 2022-06-07

## 2022-06-07 PROBLEM — I25.84 CORONARY ARTERY DISEASE DUE TO CALCIFIED CORONARY LESION: Status: ACTIVE | Noted: 2022-06-07

## 2022-06-07 PROBLEM — I25.10 CORONARY ARTERY DISEASE DUE TO CALCIFIED CORONARY LESION: Status: ACTIVE | Noted: 2022-06-07

## 2022-06-07 PROBLEM — I24.0 RCA OCCLUSION: Status: ACTIVE | Noted: 2022-06-07

## 2022-06-07 PROBLEM — I63.9 CVA (CEREBRAL VASCULAR ACCIDENT) (HCC): Status: ACTIVE | Noted: 2022-06-07

## 2022-06-07 PROBLEM — I25.5 ISCHEMIC CARDIOMYOPATHY: Status: ACTIVE | Noted: 2022-06-07

## 2022-06-10 ENCOUNTER — OFFICE VISIT (OUTPATIENT)
Dept: CARDIOLOGY | Facility: CLINIC | Age: 87
End: 2022-06-10

## 2022-06-10 VITALS
DIASTOLIC BLOOD PRESSURE: 68 MMHG | WEIGHT: 141.8 LBS | BODY MASS INDEX: 24.21 KG/M2 | HEART RATE: 78 BPM | HEIGHT: 64 IN | SYSTOLIC BLOOD PRESSURE: 136 MMHG | OXYGEN SATURATION: 99 %

## 2022-06-10 DIAGNOSIS — Z86.718 HISTORY OF DVT (DEEP VEIN THROMBOSIS): ICD-10-CM

## 2022-06-10 DIAGNOSIS — R00.2 PALPITATIONS: ICD-10-CM

## 2022-06-10 DIAGNOSIS — I21.3 ST ELEVATION MYOCARDIAL INFARCTION (STEMI), UNSPECIFIED ARTERY: ICD-10-CM

## 2022-06-10 DIAGNOSIS — I24.0 RCA OCCLUSION: ICD-10-CM

## 2022-06-10 DIAGNOSIS — I25.5 ISCHEMIC CARDIOMYOPATHY: ICD-10-CM

## 2022-06-10 DIAGNOSIS — R06.02 SHORTNESS OF BREATH: ICD-10-CM

## 2022-06-10 DIAGNOSIS — I71.9 AORTIC ANEURYSM WITHOUT RUPTURE, UNSPECIFIED PORTION OF AORTA: Primary | ICD-10-CM

## 2022-06-10 DIAGNOSIS — E78.5 DYSLIPIDEMIA: ICD-10-CM

## 2022-06-10 DIAGNOSIS — R09.89 LEFT CAROTID BRUIT: ICD-10-CM

## 2022-06-10 DIAGNOSIS — I10 ESSENTIAL HYPERTENSION: ICD-10-CM

## 2022-06-10 DIAGNOSIS — F17.200 SMOKER: ICD-10-CM

## 2022-06-10 DIAGNOSIS — I25.84 CORONARY ARTERY DISEASE DUE TO CALCIFIED CORONARY LESION: ICD-10-CM

## 2022-06-10 DIAGNOSIS — Z86.73 HISTORY OF CVA (CEREBROVASCULAR ACCIDENT): ICD-10-CM

## 2022-06-10 DIAGNOSIS — I35.1 NONRHEUMATIC AORTIC VALVE REGURGITATION: ICD-10-CM

## 2022-06-10 DIAGNOSIS — I25.10 CORONARY ARTERY DISEASE DUE TO CALCIFIED CORONARY LESION: ICD-10-CM

## 2022-06-10 DIAGNOSIS — R07.2 PRECORDIAL PAIN: ICD-10-CM

## 2022-06-10 PROCEDURE — 93000 ELECTROCARDIOGRAM COMPLETE: CPT | Performed by: SPECIALIST

## 2022-06-10 PROCEDURE — 99204 OFFICE O/P NEW MOD 45 MIN: CPT | Performed by: SPECIALIST

## 2022-06-10 RX ORDER — ATORVASTATIN CALCIUM 40 MG/1
40 TABLET, FILM COATED ORAL
COMMUNITY
Start: 2022-05-26 | End: 2022-06-10

## 2022-06-10 RX ORDER — SACUBITRIL AND VALSARTAN 24; 26 MG/1; MG/1
1 TABLET, FILM COATED ORAL 2 TIMES DAILY
Qty: 180 TABLET | Refills: 1 | Status: SHIPPED | OUTPATIENT
Start: 2022-06-10 | End: 2023-01-30 | Stop reason: SDUPTHER

## 2022-06-10 RX ORDER — ATORVASTATIN CALCIUM 80 MG/1
80 TABLET, FILM COATED ORAL
Status: SHIPPED | COMMUNITY
Start: 2022-06-10 | End: 2022-06-10 | Stop reason: SDUPTHER

## 2022-06-10 RX ORDER — TICAGRELOR 90 MG/1
90 TABLET ORAL 2 TIMES DAILY
Qty: 180 TABLET | Refills: 1 | Status: SHIPPED | OUTPATIENT
Start: 2022-06-10 | End: 2023-01-30

## 2022-06-10 RX ORDER — TICAGRELOR 90 MG/1
90 TABLET ORAL 2 TIMES DAILY
COMMUNITY
Start: 2022-05-26 | End: 2022-06-10 | Stop reason: SDUPTHER

## 2022-06-10 RX ORDER — METOPROLOL SUCCINATE 50 MG/1
50 TABLET, EXTENDED RELEASE ORAL DAILY
Qty: 90 TABLET | Refills: 1 | Status: SHIPPED | OUTPATIENT
Start: 2022-06-10 | End: 2022-12-19

## 2022-06-10 RX ORDER — METOPROLOL TARTRATE 50 MG/1
50 TABLET, FILM COATED ORAL DAILY
COMMUNITY
Start: 2022-05-26 | End: 2022-06-10 | Stop reason: SDUPTHER

## 2022-06-10 RX ORDER — ATORVASTATIN CALCIUM 80 MG/1
80 TABLET, FILM COATED ORAL
Qty: 90 TABLET | Refills: 1 | Status: SHIPPED | OUTPATIENT
Start: 2022-06-10 | End: 2022-12-19

## 2022-06-10 RX ORDER — NAPROXEN 500 MG/1
TABLET ORAL
COMMUNITY
Start: 2022-03-22 | End: 2022-06-10 | Stop reason: DRUGHIGH

## 2022-06-10 RX ORDER — SACUBITRIL AND VALSARTAN 24; 26 MG/1; MG/1
1 TABLET, FILM COATED ORAL 2 TIMES DAILY
COMMUNITY
Start: 2022-05-26 | End: 2022-06-10 | Stop reason: SDUPTHER

## 2022-06-10 NOTE — PROGRESS NOTES
Subjective   Initial consultation, post inferior MI, PCI, questionable aortic aneurysm  Emertia Lew is a 86 y.o. female who presents to day for Establish Care (Here for eval. For HTN and aortic aneur. ), Coronary Artery Disease, Hypertension, Cardiomyopathy, Stroke, Deep Vein Thrombosis, Shortness of Breath, and Aortic Aneurysm.    CHIEF COMPLIANT  Chief Complaint   Patient presents with   • Establish Care     Here for eval. For HTN and aortic aneur.    • Coronary Artery Disease   • Hypertension   • Cardiomyopathy   • Stroke   • Deep Vein Thrombosis   • Shortness of Breath   • Aortic Aneurysm     Problem List:    0. Aortic aneurysm  1. CAD, setting of inferior wall STEMI, [facility] 5-. [facility], 5- per Dr. Mckinnon PCI RCA with 3.0 x 38 mm and 3.5 x 18 mm KALLIE, which was totally occluded, she has 80-90% of LCx proximally  2.1 Echo, 5- at [facility]. EF 35-40%, akinesis of inferior wall and basal and mid inferoseptal wall seg. , grade 2 DD, mild AV sclerosis, mild AR, mild PHTN, 38.70 mmHg mild MR, trace TR, trace-mild physiologic IA, small pericardial effusion  3. HTN  4. HX DVT left leg  5. CVA  6. COPD  7. Smoker    Problem List Items Addressed This Visit        Cardiac and Vasculature    Essential hypertension    Relevant Medications    Brilinta 90 MG tablet tablet    metoprolol succinate XL (TOPROL-XL) 50 MG 24 hr tablet    Coronary artery disease due to calcified coronary lesion    Relevant Medications    Brilinta 90 MG tablet tablet    Entresto 24-26 MG tablet    metoprolol succinate XL (TOPROL-XL) 50 MG 24 hr tablet    Other Relevant Orders    ECG 12 Lead    RCA occlusion (HCC)    Relevant Medications    Brilinta 90 MG tablet tablet    Entresto 24-26 MG tablet    metoprolol succinate XL (TOPROL-XL) 50 MG 24 hr tablet    STEMI (ST elevation myocardial infarction) (HCC)    Relevant Medications    Brilinta 90 MG tablet tablet    Entresto 24-26 MG tablet    metoprolol succinate XL (TOPROL-XL) 50 MG 24 hr  tablet    Ischemic cardiomyopathy    Relevant Medications    Brilinta 90 MG tablet tablet    Entresto 24-26 MG tablet    metoprolol succinate XL (TOPROL-XL) 50 MG 24 hr tablet    Aortic aneurysm without rupture (HCC) - Primary    Relevant Orders    US aorta limited    Precordial pain    Relevant Orders    ECG 12 Lead    Left carotid bruit    Relevant Orders    US Carotid Bilateral    Dyslipidemia    Relevant Medications    atorvastatin (LIPITOR) 80 MG tablet    Nonrheumatic aortic valve regurgitation    Relevant Medications    Brilinta 90 MG tablet tablet    Entresto 24-26 MG tablet    metoprolol succinate XL (TOPROL-XL) 50 MG 24 hr tablet       Coag and Thromboembolic    History of DVT (deep vein thrombosis)       Neuro    History of CVA (cerebrovascular accident)    Relevant Orders    US Carotid Bilateral       Tobacco    Smoker      Other Visit Diagnoses     Shortness of breath        Relevant Orders    ECG 12 Lead    Palpitations        Relevant Orders    ECG 12 Lead          HPI  Patient was admitted recently with an acute inferior infarction and she underwent PCI of the RCA which is completely occluded she also had 80 to 90% stenosis of the proximal circumflex artery she still getting some angina kind of symptoms with chest discomfort on and off not particular related to exertion she is denying any shortness of breath or edema patient has palpitations still she gets a little bit lightheaded and weak in the morning she used to smoke and she still does since she was 18 now she is cutting down she smokes 1 or 2 cigarettes used to smoke on average about three quarters of a pack there is some history of CVA but she cannot remember the issue she has hypertension, hyperlipidemia she is prediabetic no significant family history of heart she also had an MRI scan of her spine but she was told that she has an aortic aneurysm by the neurosurgeon who seen her                  PRIOR MEDS  Current Outpatient Medications on  File Prior to Visit   Medication Sig Dispense Refill   • aspirin 81 MG chewable tablet Chew 81 mg Daily.     • docusate sodium (COLACE) 100 MG capsule Take 1 capsule by mouth 2 (Two) Times a Day. 20 capsule 0   • [DISCONTINUED] atorvastatin (LIPITOR) 40 MG tablet Take 40 mg by mouth every night at bedtime.     • [DISCONTINUED] atorvastatin (LIPITOR) 80 MG tablet Take 1 tablet by mouth every night at bedtime.     • [DISCONTINUED] Brilinta 90 MG tablet tablet Take 90 mg by mouth 2 (Two) Times a Day.     • [DISCONTINUED] Entresto 24-26 MG tablet Take 1 tablet by mouth 2 (Two) Times a Day.     • [DISCONTINUED] metoprolol tartrate (LOPRESSOR) 50 MG tablet Take 50 mg by mouth Daily.     • [DISCONTINUED] naproxen (EC NAPROSYN) 500 MG EC tablet Take 1 tablet by mouth 2 (Two) Times a Day As Needed for Mild Pain . 12 tablet 0   • [DISCONTINUED] atenolol (TENORMIN) 25 MG tablet Take 25 mg by mouth Daily.     • [DISCONTINUED] naproxen (NAPROSYN) 500 MG tablet TAKE ONE TABLET BY MOUTH EVERY 6 TO 8 HOURS AS NEEDED - MAX OF 3 TABLETS IN 24 HOURS     • [DISCONTINUED] naproxen sodium (ALEVE) 220 MG tablet Take 220 mg by mouth 2 (Two) Times a Day As Needed.     • [DISCONTINUED] tiZANidine (ZANAFLEX) 2 MG tablet Take 1 tablet by mouth At Night As Needed for Muscle Spasms. 10 tablet 0     No current facility-administered medications on file prior to visit.       ALLERGIES  Percocet [oxycodone-acetaminophen]    HISTORY  Past Medical History:   Diagnosis Date   • Aneurysm (HCC)     aortic   • Arthritis    • COPD (chronic obstructive pulmonary disease) (HCC)    • Coronary artery disease    • DVT (deep venous thrombosis) ([facility])    • Hyperlipidemia    • Hypertension    • Ischemic cardiomyopathy    • Myocardial infarction (HCC)     stemi [facility] 5-       Social History     Socioeconomic History   • Marital status:    Tobacco Use   • Smoking status: Current Every Day Smoker   • Smokeless tobacco: Never Used   • Tobacco comment:  "smoked a 3/4 PPD since age 18   Substance and Sexual Activity   • Alcohol use: No   • Drug use: No   • Sexual activity: Defer       Family History   Problem Relation Age of Onset   • Stroke Mother    • Lung cancer Father        Review of Systems   Constitutional: Positive for fatigue (off and on).   HENT: Negative.    Eyes: Positive for visual disturbance (glasses prn).   Respiratory: Positive for shortness of breath (mildly).    Cardiovascular: Positive for chest pain and palpitations. Negative for leg swelling.   Gastrointestinal: Positive for constipation.   Endocrine: Negative.    Genitourinary: Negative.    Musculoskeletal: Positive for arthralgias and myalgias.   Skin: Negative.    Allergic/Immunologic: Negative.    Neurological: Positive for dizziness and light-headedness. Negative for syncope.   Hematological: Bruises/bleeds easily.   Psychiatric/Behavioral: Positive for sleep disturbance (off and on).       Objective     VITALS: /68 (BP Location: Left arm, Patient Position: Sitting)   Pulse 78   Ht 162.6 cm (64.02\")   Wt 64.3 kg (141 lb 12.8 oz)   SpO2 99%   BMI 24.33 kg/m²     LABS:   Lab Results (most recent)     None          IMAGING:   XR Spine Lumbar 2 or 3 View    Result Date: 3/6/2022  1.  Evidence for a remote-appearing moderate compression fracture deformity at the L1 vertebral level. There is no associated malalignment. 2.  No evidence for acute displaced fracture or subluxation. No definitive acute compression fracture is seen. 3.  Advanced degenerative changes throughout the lumbar spine.  This report was finalized on 3/6/2022 3:07 PM by Michael Sommer MD.        EXAM:  Physical Exam  Vitals reviewed.   Constitutional:       Appearance: She is well-developed.   HENT:      Head: Normocephalic and atraumatic.   Eyes:      Pupils: Pupils are equal, round, and reactive to light.   Neck:      Thyroid: No thyromegaly.      Vascular: Carotid bruit present. No JVD.      Comments: Faint left " carotid bruit  Cardiovascular:      Rate and Rhythm: Normal rate and regular rhythm.      Heart sounds: Normal heart sounds. No murmur heard.    No friction rub. No gallop.   Pulmonary:      Effort: Pulmonary effort is normal. No respiratory distress.      Breath sounds: Normal breath sounds. No stridor. No wheezing or rales.   Chest:      Chest wall: No tenderness.   Musculoskeletal:         General: No tenderness or deformity.      Cervical back: Neck supple.   Skin:     General: Skin is warm and dry.   Neurological:      Mental Status: She is alert and oriented to person, place, and time.      Cranial Nerves: No cranial nerve deficit.      Coordination: Coordination normal.         Procedure     ECG 12 Lead    Date/Time: 6/10/2022 11:38 AM  Performed by: Brandi Barboza MD  Authorized by: Brandi Barboza MD   Comparison: compared with previous ECG           EKG: Shows normal sinus rhythm with PVCs and PVC couplets also with T wave inversion at the inferior leads consistent with known history of recent myocardial infarction otherwise unremarkable EKG comparing with the EKG which she done at the hospital dated May 24, 2022 at the time showed ST elevation inferior infarction now this elevation has resolved       Assessment & Plan     Diagnoses and all orders for this visit:    1. Aortic aneurysm without rupture, unspecified portion of aorta (HCC) (Primary)  -     US aorta limited; Future    2. Coronary artery disease due to calcified coronary lesion  -     ECG 12 Lead    3. Essential hypertension  -     Brilinta 90 MG tablet tablet; Take 1 tablet by mouth 2 (Two) Times a Day.  Dispense: 180 tablet; Refill: 1    4. Ischemic cardiomyopathy  -     Entresto 24-26 MG tablet; Take 1 tablet by mouth 2 (Two) Times a Day.  Dispense: 180 tablet; Refill: 1  -     metoprolol succinate XL (TOPROL-XL) 50 MG 24 hr tablet; Take 1 tablet by mouth Daily.  Dispense: 90 tablet; Refill: 1    5. RCA occlusion (HCC)    6. ST elevation  myocardial infarction (STEMI), unspecified artery (HCC)    7. History of DVT (deep vein thrombosis)    8. Smoker    9. Precordial pain  -     ECG 12 Lead    10. Shortness of breath  -     ECG 12 Lead    11. Palpitations  -     ECG 12 Lead    12. History of CVA (cerebrovascular accident)  -     US Carotid Bilateral; Future    13. Left carotid bruit  -     US Carotid Bilateral; Future    14. Dyslipidemia  -     atorvastatin (LIPITOR) 80 MG tablet; Take 1 tablet by mouth every night at bedtime.  Dispense: 90 tablet; Refill: 1    15. Nonrheumatic aortic valve regurgitation    1.  I reviewed the cardiac catheterization report which showed some diffuse disease of LAD about 80 to 90% proximal circumflex artery the right coronary artery is already stented but she still having chest pains on and off someone to refer her for cardiac catheterization and PCI of the circumflex artery we will continue with dual antiplatelet therapy however I am concerned that she is also taking Naprosyn so advised her to quit taking that  2.  I reviewed her lipids was significantly elevated LDL of 179 I do not think the current dose of atorvastatin will be sufficient I am going to increase this to 80 mg/day  3.  Because of the ischemic cardiomyopathy she is already on Entresto I am going to switch her metoprolol to the succinate form and will consider plus or minus adding later on also spironolactone for optimization of her GDMT following revascularization we will repeat the echocardiogram in few weeks time  4.  She has very faint left carotid bruit she has history of CVA in the past I am going to get carotid Doppler  5.  She is having palpitations and on the EKG she has frequent PVCs she had a heart monitor placed the result of which is not available to me we will try to get it  6.  We had a long discussion regarding tobacco abuse I strongly advised her to completely quit smoking  7.  I reviewed the MRI scan of the spine on April 1, 2022 which  showed about a 8 mm aneurysm is not clear at this infrarenal we will get an ultrasound of her process    Return in about 4 weeks (around 7/8/2022).      Advance Care Planning   ACP discussion was held with the patient during this visit. Patient does not have an advance directive, declines further assistance.          MEDS ORDERED DURING VISIT:  New Medications Ordered This Visit   Medications   • atorvastatin (LIPITOR) 80 MG tablet     Sig: Take 1 tablet by mouth every night at bedtime.     Dispense:  90 tablet     Refill:  1   • Brilinta 90 MG tablet tablet     Sig: Take 1 tablet by mouth 2 (Two) Times a Day.     Dispense:  180 tablet     Refill:  1   • Entresto 24-26 MG tablet     Sig: Take 1 tablet by mouth 2 (Two) Times a Day.     Dispense:  180 tablet     Refill:  1   • metoprolol succinate XL (TOPROL-XL) 50 MG 24 hr tablet     Sig: Take 1 tablet by mouth Daily.     Dispense:  90 tablet     Refill:  1       As always, Federico Dave APRN  I appreciate very much the opportunity to participate in the cardiovascular care of your patients. Please do not hesitate to call me with any questions with regards to Emerita Lew evaluation and management.         This document has been electronically signed by Brandi Barboza MD  Radha 10, 2022 12:38 EDT

## 2022-06-20 ENCOUNTER — LAB (OUTPATIENT)
Dept: LAB | Facility: HOSPITAL | Age: 87
End: 2022-06-20

## 2022-06-20 DIAGNOSIS — I25.10 CORONARY ARTERY DISEASE DUE TO CALCIFIED CORONARY LESION: ICD-10-CM

## 2022-06-20 DIAGNOSIS — R07.2 PRECORDIAL PAIN: ICD-10-CM

## 2022-06-20 DIAGNOSIS — I10 ESSENTIAL HYPERTENSION: ICD-10-CM

## 2022-06-20 DIAGNOSIS — R00.2 PALPITATIONS: ICD-10-CM

## 2022-06-20 DIAGNOSIS — I25.84 CORONARY ARTERY DISEASE DUE TO CALCIFIED CORONARY LESION: ICD-10-CM

## 2022-06-20 DIAGNOSIS — R06.02 SHORTNESS OF BREATH: ICD-10-CM

## 2022-06-20 DIAGNOSIS — I25.5 ISCHEMIC CARDIOMYOPATHY: ICD-10-CM

## 2022-06-20 LAB
ANION GAP SERPL CALCULATED.3IONS-SCNC: 14.8 MMOL/L (ref 5–15)
BASOPHILS # BLD AUTO: 0.04 10*3/MM3 (ref 0–0.2)
BASOPHILS NFR BLD AUTO: 0.7 % (ref 0–1.5)
BUN SERPL-MCNC: 20 MG/DL (ref 8–23)
BUN/CREAT SERPL: 17.7 (ref 7–25)
CALCIUM SPEC-SCNC: 9.7 MG/DL (ref 8.6–10.5)
CHLORIDE SERPL-SCNC: 106 MMOL/L (ref 98–107)
CO2 SERPL-SCNC: 21.2 MMOL/L (ref 22–29)
CREAT SERPL-MCNC: 1.13 MG/DL (ref 0.57–1)
DEPRECATED RDW RBC AUTO: 46.3 FL (ref 37–54)
EGFRCR SERPLBLD CKD-EPI 2021: 47.8 ML/MIN/1.73
EOSINOPHIL # BLD AUTO: 0.14 10*3/MM3 (ref 0–0.4)
EOSINOPHIL NFR BLD AUTO: 2.5 % (ref 0.3–6.2)
ERYTHROCYTE [DISTWIDTH] IN BLOOD BY AUTOMATED COUNT: 13.6 % (ref 12.3–15.4)
GLUCOSE SERPL-MCNC: 101 MG/DL (ref 65–99)
HCT VFR BLD AUTO: 42.9 % (ref 34–46.6)
HGB BLD-MCNC: 14 G/DL (ref 12–15.9)
IMM GRANULOCYTES # BLD AUTO: 0.01 10*3/MM3 (ref 0–0.05)
IMM GRANULOCYTES NFR BLD AUTO: 0.2 % (ref 0–0.5)
LYMPHOCYTES # BLD AUTO: 2.08 10*3/MM3 (ref 0.7–3.1)
LYMPHOCYTES NFR BLD AUTO: 37.1 % (ref 19.6–45.3)
MCH RBC QN AUTO: 30.3 PG (ref 26.6–33)
MCHC RBC AUTO-ENTMCNC: 32.6 G/DL (ref 31.5–35.7)
MCV RBC AUTO: 92.9 FL (ref 79–97)
MONOCYTES # BLD AUTO: 0.5 10*3/MM3 (ref 0.1–0.9)
MONOCYTES NFR BLD AUTO: 8.9 % (ref 5–12)
NEUTROPHILS NFR BLD AUTO: 2.83 10*3/MM3 (ref 1.7–7)
NEUTROPHILS NFR BLD AUTO: 50.6 % (ref 42.7–76)
NRBC BLD AUTO-RTO: 0 /100 WBC (ref 0–0.2)
PLATELET # BLD AUTO: 208 10*3/MM3 (ref 140–450)
PMV BLD AUTO: 12.9 FL (ref 6–12)
POTASSIUM SERPL-SCNC: 4.5 MMOL/L (ref 3.5–5.2)
RBC # BLD AUTO: 4.62 10*6/MM3 (ref 3.77–5.28)
SODIUM SERPL-SCNC: 142 MMOL/L (ref 136–145)
WBC NRBC COR # BLD: 5.6 10*3/MM3 (ref 3.4–10.8)

## 2022-06-20 PROCEDURE — 85025 COMPLETE CBC W/AUTO DIFF WBC: CPT

## 2022-06-20 PROCEDURE — 36415 COLL VENOUS BLD VENIPUNCTURE: CPT

## 2022-06-20 PROCEDURE — 80048 BASIC METABOLIC PNL TOTAL CA: CPT

## 2022-07-05 ENCOUNTER — OUTSIDE FACILITY SERVICE (OUTPATIENT)
Dept: CARDIOLOGY | Facility: CLINIC | Age: 87
End: 2022-07-05

## 2022-07-05 DIAGNOSIS — I25.5 ISCHEMIC CARDIOMYOPATHY: ICD-10-CM

## 2022-07-05 PROCEDURE — 92928 PRQ TCAT PLMT NTRAC ST 1 LES: CPT | Performed by: INTERNAL MEDICINE

## 2022-07-05 PROCEDURE — 93458 L HRT ARTERY/VENTRICLE ANGIO: CPT | Performed by: INTERNAL MEDICINE

## 2022-07-13 ENCOUNTER — HOSPITAL ENCOUNTER (OUTPATIENT)
Dept: CARDIOLOGY | Facility: HOSPITAL | Age: 87
Discharge: HOME | End: 2022-07-13

## 2022-07-13 DIAGNOSIS — R09.89 LEFT CAROTID BRUIT: ICD-10-CM

## 2022-07-13 DIAGNOSIS — Z86.73 HISTORY OF CVA (CEREBROVASCULAR ACCIDENT): ICD-10-CM

## 2022-07-13 DIAGNOSIS — I71.9 AORTIC ANEURYSM WITHOUT RUPTURE, UNSPECIFIED PORTION OF AORTA: ICD-10-CM

## 2022-07-13 LAB
BH CV XLRA MEAS LEFT DIST CCA EDV: 10.6 CM/SEC
BH CV XLRA MEAS LEFT DIST CCA PSV: 68 CM/SEC
BH CV XLRA MEAS LEFT DIST ICA EDV: -18.5 CM/SEC
BH CV XLRA MEAS LEFT DIST ICA PSV: -68 CM/SEC
BH CV XLRA MEAS LEFT ICA/CCA RATIO: -1.28
BH CV XLRA MEAS LEFT MID ICA EDV: -19.3 CM/SEC
BH CV XLRA MEAS LEFT MID ICA PSV: -86.8 CM/SEC
BH CV XLRA MEAS LEFT PROX CCA EDV: 13.8 CM/SEC
BH CV XLRA MEAS LEFT PROX CCA PSV: 110 CM/SEC
BH CV XLRA MEAS LEFT PROX ECA EDV: -6.7 CM/SEC
BH CV XLRA MEAS LEFT PROX ECA PSV: -85.6 CM/SEC
BH CV XLRA MEAS LEFT PROX ICA EDV: -24.4 CM/SEC
BH CV XLRA MEAS LEFT PROX ICA PSV: -82.5 CM/SEC
BH CV XLRA MEAS LEFT VERTEBRAL A EDV: 9.8 CM/SEC
BH CV XLRA MEAS LEFT VERTEBRAL A PSV: 38.9 CM/SEC
BH CV XLRA MEAS RIGHT DIST CCA EDV: -16.5 CM/SEC
BH CV XLRA MEAS RIGHT DIST CCA PSV: -64.5 CM/SEC
BH CV XLRA MEAS RIGHT DIST ICA EDV: -23 CM/SEC
BH CV XLRA MEAS RIGHT DIST ICA PSV: -80.1 CM/SEC
BH CV XLRA MEAS RIGHT ICA/CCA RATIO: 1.72
BH CV XLRA MEAS RIGHT MID ICA EDV: -28.1 CM/SEC
BH CV XLRA MEAS RIGHT MID ICA PSV: -110.8 CM/SEC
BH CV XLRA MEAS RIGHT PROX CCA EDV: 12.7 CM/SEC
BH CV XLRA MEAS RIGHT PROX CCA PSV: 88.8 CM/SEC
BH CV XLRA MEAS RIGHT PROX ECA EDV: -9.4 CM/SEC
BH CV XLRA MEAS RIGHT PROX ECA PSV: -93.7 CM/SEC
BH CV XLRA MEAS RIGHT PROX ICA EDV: -25.4 CM/SEC
BH CV XLRA MEAS RIGHT PROX ICA PSV: -78.9 CM/SEC
BH CV XLRA MEAS RIGHT VERTEBRAL A EDV: 13.8 CM/SEC
BH CV XLRA MEAS RIGHT VERTEBRAL A PSV: 57.4 CM/SEC

## 2022-07-13 PROCEDURE — 76775 US EXAM ABDO BACK WALL LIM: CPT

## 2022-07-13 PROCEDURE — 93880 EXTRACRANIAL BILAT STUDY: CPT | Performed by: RADIOLOGY

## 2022-07-13 PROCEDURE — 93880 EXTRACRANIAL BILAT STUDY: CPT

## 2022-07-13 PROCEDURE — 76775 US EXAM ABDO BACK WALL LIM: CPT | Performed by: RADIOLOGY

## 2022-07-27 ENCOUNTER — OFFICE VISIT (OUTPATIENT)
Dept: CARDIOLOGY | Facility: CLINIC | Age: 87
End: 2022-07-27

## 2022-07-27 VITALS
WEIGHT: 137.4 LBS | SYSTOLIC BLOOD PRESSURE: 125 MMHG | DIASTOLIC BLOOD PRESSURE: 71 MMHG | HEIGHT: 64 IN | HEART RATE: 58 BPM | BODY MASS INDEX: 23.46 KG/M2 | OXYGEN SATURATION: 98 %

## 2022-07-27 DIAGNOSIS — I10 PRIMARY HYPERTENSION: ICD-10-CM

## 2022-07-27 DIAGNOSIS — I25.10 CORONARY ARTERY DISEASE INVOLVING NATIVE CORONARY ARTERY OF NATIVE HEART WITHOUT ANGINA PECTORIS: Primary | ICD-10-CM

## 2022-07-27 DIAGNOSIS — R06.02 SHORTNESS OF BREATH: ICD-10-CM

## 2022-07-27 PROCEDURE — 99214 OFFICE O/P EST MOD 30 MIN: CPT | Performed by: PHYSICIAN ASSISTANT

## 2022-07-27 NOTE — PROGRESS NOTES
Problem list     Subjective   Emerita Lew is a 87 y.o. female     Chief Complaint   Patient presents with   • Follow-up     Cath 7/5/2022   Problem list:  1.  Coronary artery disease.  1.1.  ST elevation MI/inferior wall MI with subsequent catheterization, 5/2022.  The patient had occlusion of the ostium of the RCA with subsequent stenting x2 for the same.  The patient had residual disease of approximately 85% in the circumflex.  1.2.  Elective intervention of the circumflex/OM disease, 7/2022.  The patient had stenting times one of the high-grade disease of the OM.  Previously placed stent to the RCA was patent.  The patient had minor disease otherwise.  2.  Ischemic cardiomyopathy.  2.1.  Estimated EF at time of inferior wall MI/ST elevation MI in May, 2022, reported at 40±5%.  2.2.  Ventriculography during catheterization, July, 2022, demonstrating an EF of 55%.  3.  Reported history of abdominal aortic aneurysm, but no aneurysmal formation noted on ultrasound, 7/2022.  4.  Carotid artery stenosis.  4.1.  Nonobstructive disease in bilateral carotid systems, less than 50%, per carotid duplex on 7/2022.  5.  Hypertension  6.  Dyslipidemia.  7.  History of CVA.  8.  History of DVT per report.  HPI  The patient presents into the clinic today for follow-up post catheterization.  She was scheduled for catheterization with planned elective intervention of the OM.  She had documented disease of the OM of 85% or so by previous catheterization performed at time of ST elevation MI with subsequent stenting of the RCA.  She was returned for intervention of the same because of ongoing symptoms.  She had stenting of that earlier this month.  She now feels well in that regard.  Only rarely will she experience mild chest discomfort, nothing of severity of what she had prior to the above interventions.  Her dyspnea and energy level seems to be progressively improving.  She has no failure symptoms.  She denies dysrhythmic  symptoms.  She does have concerned because of recent fatigue and dizziness, which she feels correlates to both bradycardia and hypotension.  She has no further complaints otherwise at this time.    Current Outpatient Medications on File Prior to Visit   Medication Sig Dispense Refill   • aspirin 81 MG chewable tablet Chew 81 mg Daily.     • atorvastatin (LIPITOR) 80 MG tablet Take 1 tablet by mouth every night at bedtime. 90 tablet 1   • Brilinta 90 MG tablet tablet Take 1 tablet by mouth 2 (Two) Times a Day. 180 tablet 1   • Entresto 24-26 MG tablet Take 1 tablet by mouth 2 (Two) Times a Day. 180 tablet 1   • metoprolol succinate XL (TOPROL-XL) 50 MG 24 hr tablet Take 1 tablet by mouth Daily. 90 tablet 1   • [DISCONTINUED] docusate sodium (COLACE) 100 MG capsule Take 1 capsule by mouth 2 (Two) Times a Day. 20 capsule 0     No current facility-administered medications on file prior to visit.       Percocet [oxycodone-acetaminophen]    Past Medical History:   Diagnosis Date   • Aneurysm (HCC)     aortic   • Arthritis    • COPD (chronic obstructive pulmonary disease) ([facility])    • Coronary artery disease    • DVT (deep venous thrombosis) ([facility])    • Hyperlipidemia    • Hypertension    • Ischemic cardiomyopathy    • Myocardial infarction ([facility])     stemi [facility] 5-       Social History     Socioeconomic History   • Marital status:    Tobacco Use   • Smoking status: Current Every Day Smoker   • Smokeless tobacco: Never Used   • Tobacco comment: smoked a 3/4 PPD since age 18   Substance and Sexual Activity   • Alcohol use: No   • Drug use: No   • Sexual activity: Defer       Family History   Problem Relation Age of Onset   • Stroke Mother    • Lung cancer Father        Review of Systems   Constitutional: Negative for chills, fatigue and fever.   HENT: Negative.  Negative for congestion, rhinorrhea and sore throat.    Eyes: Positive for visual disturbance (contacts).   Respiratory: Negative.  Negative for chest  "tightness, shortness of breath and wheezing.    Cardiovascular: Negative.  Negative for chest pain, palpitations and leg swelling.   Gastrointestinal: Negative.    Endocrine: Negative.    Genitourinary: Negative.    Musculoskeletal: Negative.  Negative for arthralgias, back pain and neck pain.   Skin: Negative.  Negative for rash and wound.   Allergic/Immunologic: Negative.  Negative for environmental allergies.   Neurological: Positive for dizziness, weakness and light-headedness. Negative for numbness and headaches.   Hematological: Bruises/bleeds easily.   Psychiatric/Behavioral: Negative.  Negative for sleep disturbance.       Objective   Vitals:    07/27/22 1331   BP: 125/71   BP Location: Left arm   Patient Position: Sitting   Pulse: 58   SpO2: 98%   Weight: 62.3 kg (137 lb 6.4 oz)   Height: 162.6 cm (64.02\")      /71 (BP Location: Left arm, Patient Position: Sitting)   Pulse 58   Ht 162.6 cm (64.02\")   Wt 62.3 kg (137 lb 6.4 oz)   SpO2 98%   BMI 23.57 kg/m²    Lab Results (most recent)     None        Physical Exam  Vitals and nursing note reviewed.   Constitutional:       General: She is not in acute distress.     Appearance: She is well-developed.   HENT:      Head: Normocephalic and atraumatic.   Eyes:      Conjunctiva/sclera: Conjunctivae normal.      Pupils: Pupils are equal, round, and reactive to light.   Neck:      Vascular: No JVD.      Trachea: No tracheal deviation.   Cardiovascular:      Rate and Rhythm: Normal rate and regular rhythm.      Heart sounds: Normal heart sounds.      Comments: Soft systolic ejection murmur noted.  Pulmonary:      Effort: Pulmonary effort is normal.      Breath sounds: Normal breath sounds.   Abdominal:      General: Bowel sounds are normal. There is no distension.      Palpations: Abdomen is soft. There is no mass.      Tenderness: There is no abdominal tenderness. There is no guarding or rebound.   Musculoskeletal:         General: No tenderness or " deformity. Normal range of motion.      Cervical back: Normal range of motion and neck supple.      Right lower le+ Edema present.      Left lower le+ Edema present.   Skin:     General: Skin is warm and dry.      Coloration: Skin is not pale.      Findings: No erythema or rash.   Neurological:      Mental Status: She is alert and oriented to person, place, and time.   Psychiatric:         Behavior: Behavior normal.         Thought Content: Thought content normal.         Judgment: Judgment normal.           Procedure   Procedures       Assessment & Plan      Diagnosis Plan   1. Coronary artery disease involving native coronary artery of native heart without angina pectoris     2. Shortness of breath     3. Primary hypertension     1.  The patient is much improved status post recent stenting of the OM.  She had a patent stent to the RCA noted at that time as well.  She has minor disease appreciated in the coronary vasculature otherwise which we will manage medically.    2.  Her only concern at this time is with hypotension and bradycardia, with associated weakness and dizziness.  I would decrease for now metoprolol from 50 to 25 mg daily.  She will monitor heart rate and blood pressures and call back to us for any issues.    3.  I would continue medical regimen without change.    4.  We have discussed the need for dual antiplatelet therapy for 1 year post last intervention.  She acknowledges her understanding.    5.  For change in clinical course she will call immediately.  As she is doing well, nothing further and we will continue to see her on 6-month intervals.           Advance Care Planning   ACP discussion was held with the patient during this visit. Patient has an advance directive in EMR which is still valid.               Electronically signed by:

## 2022-12-17 DIAGNOSIS — I25.5 ISCHEMIC CARDIOMYOPATHY: ICD-10-CM

## 2022-12-17 DIAGNOSIS — E78.5 DYSLIPIDEMIA: ICD-10-CM

## 2022-12-19 RX ORDER — METOPROLOL SUCCINATE 50 MG/1
TABLET, EXTENDED RELEASE ORAL
Qty: 90 TABLET | Refills: 1 | Status: SHIPPED | OUTPATIENT
Start: 2022-12-19 | End: 2023-01-30 | Stop reason: SDUPTHER

## 2022-12-19 RX ORDER — ATORVASTATIN CALCIUM 80 MG/1
TABLET, FILM COATED ORAL
Qty: 90 TABLET | Refills: 1 | Status: SHIPPED | OUTPATIENT
Start: 2022-12-19 | End: 2023-01-30 | Stop reason: SDUPTHER

## 2022-12-19 NOTE — TELEPHONE ENCOUNTER
Name from pharmacy: atorvastatin 80 mg tablet          Will file in chart as: atorvastatin (LIPITOR) 80 MG tablet    Sig: TAKE 1 TABLET BY MOUTH AT BEDTIME    Disp:  90 tablet    Refills:  1    Start: 12/19/2022    Class: Normal    For: Dyslipidemia    To pharmacy: This prescription was filled on 9/28/2022. Any refills authorized will be placed on file.    Last ordered: 6 months ago by Brandi Barboza MD Last refill: 9/28/2022    Rx #: [id number]    [facility]MG-CoA Reductase Inhibitors (Statins) Protocol Failed 12/17/2022 08:01 AM   Protocol Details  Lipid panel in past 12 months    ALK Phos in past 12 months    ALT in past 12 months    AST in past 12 months    No active pregnancy on record    No positive pregnancy test in past 12 months    Recent or future visit with authorizing provider      [facility] [id number]

## 2023-01-30 ENCOUNTER — OFFICE VISIT (OUTPATIENT)
Dept: CARDIOLOGY | Facility: CLINIC | Age: 88
End: 2023-01-30
Payer: MEDICARE

## 2023-01-30 VITALS
HEART RATE: 73 BPM | DIASTOLIC BLOOD PRESSURE: 75 MMHG | BODY MASS INDEX: 22.13 KG/M2 | OXYGEN SATURATION: 95 % | WEIGHT: 129.6 LBS | HEIGHT: 64 IN | SYSTOLIC BLOOD PRESSURE: 140 MMHG

## 2023-01-30 DIAGNOSIS — Z86.718 HISTORY OF DVT (DEEP VEIN THROMBOSIS): ICD-10-CM

## 2023-01-30 DIAGNOSIS — I35.1 NONRHEUMATIC AORTIC VALVE REGURGITATION: ICD-10-CM

## 2023-01-30 DIAGNOSIS — I10 ESSENTIAL HYPERTENSION: ICD-10-CM

## 2023-01-30 DIAGNOSIS — E78.5 DYSLIPIDEMIA: ICD-10-CM

## 2023-01-30 DIAGNOSIS — I25.5 ISCHEMIC CARDIOMYOPATHY: ICD-10-CM

## 2023-01-30 DIAGNOSIS — F17.200 SMOKER: ICD-10-CM

## 2023-01-30 DIAGNOSIS — I25.10 CORONARY ARTERY DISEASE DUE TO CALCIFIED CORONARY LESION: Primary | ICD-10-CM

## 2023-01-30 DIAGNOSIS — I25.84 CORONARY ARTERY DISEASE DUE TO CALCIFIED CORONARY LESION: Primary | ICD-10-CM

## 2023-01-30 DIAGNOSIS — Z86.73 HISTORY OF CVA (CEREBROVASCULAR ACCIDENT): ICD-10-CM

## 2023-01-30 DIAGNOSIS — Z95.5 HISTORY OF CORONARY ANGIOPLASTY WITH INSERTION OF STENT: ICD-10-CM

## 2023-01-30 PROCEDURE — 99214 OFFICE O/P EST MOD 30 MIN: CPT | Performed by: SPECIALIST

## 2023-01-30 RX ORDER — METOPROLOL SUCCINATE 50 MG/1
50 TABLET, EXTENDED RELEASE ORAL DAILY
Qty: 90 TABLET | Refills: 1 | Status: SHIPPED | OUTPATIENT
Start: 2023-01-30 | End: 2023-03-15

## 2023-01-30 RX ORDER — SACUBITRIL AND VALSARTAN 24; 26 MG/1; MG/1
1 TABLET, FILM COATED ORAL 2 TIMES DAILY
Qty: 180 TABLET | Refills: 1 | Status: SHIPPED | OUTPATIENT
Start: 2023-01-30 | End: 2023-03-15 | Stop reason: SDUPTHER

## 2023-01-30 RX ORDER — ASPIRIN 81 MG/1
81 TABLET ORAL DAILY
Qty: 90 TABLET | Refills: 1 | Status: SHIPPED | OUTPATIENT
Start: 2023-01-30

## 2023-01-30 RX ORDER — ATORVASTATIN CALCIUM 80 MG/1
80 TABLET, FILM COATED ORAL
Qty: 90 TABLET | Refills: 1 | Status: SHIPPED | OUTPATIENT
Start: 2023-01-30 | End: 2023-03-15 | Stop reason: SDUPTHER

## 2023-01-30 NOTE — PROGRESS NOTES
Subjective   Follow up, CAD  Emerita Lew is a 87 y.o. female who presents to day for Follow-up (Here for 6 mo. F/u), Coronary Artery Disease, Hypertension, Hyperlipidemia, Cardiomyopathy, Cerebrovascular Accident, and Deep Vein Thrombosis.    CHIEF COMPLIANT  Chief Complaint   Patient presents with   • Follow-up     Here for 6 mo. F/u   • Coronary Artery Disease   • Hypertension   • Hyperlipidemia   • Cardiomyopathy   • Cerebrovascular Accident   • Deep Vein Thrombosis     Problem list:  1.  Coronary artery disease.  1.1.  ST elevation MI/inferior wall MI with subsequent catheterization, 5/2022.  The patient had occlusion of the ostium of the RCA with subsequent stenting x2 for the same.  The patient had residual disease of approximately 85% in the circumflex.  1.2.  Elective intervention of the circumflex/OM disease, 7/2022.  The patient had stenting times one of the high-grade disease of the OM.  Previously placed stent to the RCA was patent.  The patient had minor disease otherwise.  2.  Ischemic cardiomyopathy.  2.1.  Estimated EF at time of inferior wall MI/ST elevation MI in May, 2022, reported at 40±5%.  2.2.  Ventriculography during catheterization, July, 2022, demonstrating an EF of 55%.  3.  Reported history of abdominal aortic aneurysm, but no aneurysmal formation noted on ultrasound, 7/2022.  4.  Carotid artery stenosis.  4.1.  Nonobstructive disease in bilateral carotid systems, less than 50%, per carotid duplex on 7/2022.  5.  Hypertension  6.  Dyslipidemia.  7.  History of CVA.  8.  History of DVT per report.    Problem List Items Addressed This Visit        Cardiac and Vasculature    Essential hypertension    Relevant Medications    metoprolol succinate XL (TOPROL-XL) 50 MG 24 hr tablet    Coronary artery disease due to calcified coronary lesion - Primary    Relevant Medications    metoprolol succinate XL (TOPROL-XL) 50 MG 24 hr tablet    Entresto 24-26 MG tablet    aspirin 81 MG EC tablet     Other Relevant Orders    Adult Transthoracic Echo Complete w/ Color, Spectral and Contrast if necessary per protocol    Ischemic cardiomyopathy    Relevant Medications    metoprolol succinate XL (TOPROL-XL) 50 MG 24 hr tablet    Entresto 24-26 MG tablet    Other Relevant Orders    Adult Transthoracic Echo Complete w/ Color, Spectral and Contrast if necessary per protocol    Dyslipidemia    Relevant Medications    atorvastatin (LIPITOR) 80 MG tablet    Other Relevant Orders    Lipid Panel    Comprehensive Metabolic Panel    Nonrheumatic aortic valve regurgitation    Relevant Medications    metoprolol succinate XL (TOPROL-XL) 50 MG 24 hr tablet    Entresto 24-26 MG tablet    Other Relevant Orders    Adult Transthoracic Echo Complete w/ Color, Spectral and Contrast if necessary per protocol    History of coronary angioplasty with insertion of stent       Coag and Thromboembolic    History of DVT (deep vein thrombosis)       Neuro    History of CVA (cerebrovascular accident)       Tobacco    Smoker       HPI    Patient is denying any chest pain or shortness of breath or edema or palpitations however apparently because she was having occasional bruising on her left forearm she quit taking aspirin and she has been taking the Brilinta only once a day for long time now, she continues to smoke                  PRIOR MEDS  Current Outpatient Medications on File Prior to Visit   Medication Sig Dispense Refill   • [DISCONTINUED] atorvastatin (LIPITOR) 80 MG tablet TAKE 1 TABLET BY MOUTH AT BEDTIME 90 tablet 1   • [DISCONTINUED] Brilinta 90 MG tablet tablet Take 1 tablet by mouth 2 (Two) Times a Day. (Patient taking differently: Take 90 mg by mouth Daily.) 180 tablet 1   • [DISCONTINUED] Entresto 24-26 MG tablet Take 1 tablet by mouth 2 (Two) Times a Day. 180 tablet 1   • [DISCONTINUED] metoprolol succinate XL (TOPROL-XL) 50 MG 24 hr tablet TAKE 1 TABLET BY MOUTH ONCE DAILY (Patient taking differently: 25 mg Daily.) 90 tablet 1  "  • [DISCONTINUED] aspirin 81 MG chewable tablet Chew 81 mg Daily.       No current facility-administered medications on file prior to visit.       ALLERGIES  Percocet [oxycodone-acetaminophen]    HISTORY  Past Medical History:   Diagnosis Date   • Aneurysm (HCC)     aortic   • Arthritis    • COPD (chronic obstructive pulmonary disease) (HCC)    • Coronary artery disease    • DVT (deep venous thrombosis) (HCC)    • Hyperlipidemia    • Hypertension    • Ischemic cardiomyopathy    • Myocardial infarction (HCC)     stemi [facility] 5-       Social History     Socioeconomic History   • Marital status:    Tobacco Use   • Smoking status: Every Day   • Smokeless tobacco: Never   • Tobacco comments:     smoked a 3/4 PPD since age 18   Substance and Sexual Activity   • Alcohol use: No   • Drug use: No   • Sexual activity: Defer       Family History   Problem Relation Age of Onset   • Stroke Mother    • Lung cancer Father        Review of Systems   Constitutional: Positive for fatigue.   HENT: Negative.    Eyes: Positive for visual disturbance (glasses).   Respiratory: Negative.    Cardiovascular: Negative.    Gastrointestinal: Negative.    Endocrine: Negative.    Genitourinary: Negative.    Musculoskeletal: Positive for arthralgias and myalgias.   Skin: Negative.    Allergic/Immunologic: Negative.    Neurological: Negative.    Hematological: Bruises/bleeds easily.   Psychiatric/Behavioral: Positive for sleep disturbance.       Objective     VITALS: /75 (BP Location: Left arm, Patient Position: Sitting)   Pulse 73   Ht 162.6 cm (64.02\")   Wt 58.8 kg (129 lb 9.6 oz)   SpO2 95%   BMI 22.23 kg/m²     LABS:   Lab Results (most recent)     None          IMAGING:   No Images in the past 120 days found..    EXAM:  Physical Exam  Vitals reviewed.   Constitutional:       Appearance: She is well-developed.   HENT:      Head: Normocephalic and atraumatic.   Eyes:      Pupils: Pupils are equal, round, and reactive to " light.   Neck:      Thyroid: No thyromegaly.      Vascular: No JVD.   Cardiovascular:      Rate and Rhythm: Normal rate and regular rhythm.      Heart sounds: Normal heart sounds. No murmur heard.    No friction rub. No gallop.   Pulmonary:      Effort: Pulmonary effort is normal. No respiratory distress.      Breath sounds: Normal breath sounds. No stridor. No wheezing or rales.   Chest:      Chest wall: No tenderness.   Musculoskeletal:         General: No tenderness or deformity.      Cervical back: Neck supple.   Skin:     General: Skin is warm and dry.   Neurological:      Mental Status: She is alert and oriented to person, place, and time.      Cranial Nerves: No cranial nerve deficit.      Coordination: Coordination normal.         Procedure   Procedures       Assessment & Plan     Diagnoses and all orders for this visit:    1. Coronary artery disease due to calcified coronary lesion (Primary)  -     aspirin 81 MG EC tablet; Take 1 tablet by mouth Daily.  Dispense: 90 tablet; Refill: 1  -     Adult Transthoracic Echo Complete w/ Color, Spectral and Contrast if necessary per protocol; Future    2. Dyslipidemia  -     atorvastatin (LIPITOR) 80 MG tablet; Take 1 tablet by mouth every night at bedtime.  Dispense: 90 tablet; Refill: 1  -     Lipid Panel; Future  -     Comprehensive Metabolic Panel; Future    3. Essential hypertension    4. Ischemic cardiomyopathy  -     metoprolol succinate XL (TOPROL-XL) 50 MG 24 hr tablet; Take 1 tablet by mouth Daily.  Dispense: 90 tablet; Refill: 1  -     Entresto 24-26 MG tablet; Take 1 tablet by mouth 2 (Two) Times a Day.  Dispense: 180 tablet; Refill: 1  -     Adult Transthoracic Echo Complete w/ Color, Spectral and Contrast if necessary per protocol; Future    5. Nonrheumatic aortic valve regurgitation  -     Adult Transthoracic Echo Complete w/ Color, Spectral and Contrast if necessary per protocol; Future    6. History of DVT (deep vein thrombosis)    7. Smoker    8.  History of coronary angioplasty with insertion of stent    9. History of CVA (cerebrovascular accident)    1.  I expressed my major concern that the patient quit taking the aspirin altogether and she has been taking half only of the dose for greater length as she has not been having any chest pain she quit taking the Brilinta for a while I am going to restart aspirin 81 mg/day and now as her PCI has been more than 6 months I will stop Brilinta because of the major question about compliance too but she understood that she should take the aspirin for the rest of her life  2.  She has also cut down the dose of the Entresto to once a day advised her to take it again twice a day and I will repeat the echocardiac for assessment of her cardiac systolic function for further adjustment of her GDMT medications  3.  She said that she has been taking her atorvastatin once every night unfortunate have not have any lipid profile since last year and in the summer I will try to get labs prior to her next visit  4.  She still smoking cigarettes I strongly advised her to quit but she said that she is not ready to do that at the moment she understand the risks of smoking    Return in about 4 weeks (around 2/27/2023).      Advance Care Planning   ACP discussion was held with the patient during this visit. Patient has an advance directive in EMR which is still valid.              MEDS ORDERED DURING VISIT:  New Medications Ordered This Visit   Medications   • metoprolol succinate XL (TOPROL-XL) 50 MG 24 hr tablet     Sig: Take 1 tablet by mouth Daily.     Dispense:  90 tablet     Refill:  1     This prescription was filled on 9/28/2022. Any refills authorized will be placed on file.   • Entresto 24-26 MG tablet     Sig: Take 1 tablet by mouth 2 (Two) Times a Day.     Dispense:  180 tablet     Refill:  1   • atorvastatin (LIPITOR) 80 MG tablet     Sig: Take 1 tablet by mouth every night at bedtime.     Dispense:  90 tablet     Refill:  1      This prescription was filled on 9/28/2022. Any refills authorized will be placed on file.   • aspirin 81 MG EC tablet     Sig: Take 1 tablet by mouth Daily.     Dispense:  90 tablet     Refill:  1       As always, Federico Dave APRN  I appreciate very much the opportunity to participate in the cardiovascular care of your patients. Please do not hesitate to call me with any questions with regards to Emerita Lew evaluation and management.         This document has been electronically signed by Brandi Barboza MD  January 30, 2023 14:34 EST    This note is dictated utilizing voice recognition software.  Although this record has been proof read, transcriptional errors may still be present.

## 2023-02-13 ENCOUNTER — HOSPITAL ENCOUNTER (OUTPATIENT)
Dept: CARDIOLOGY | Facility: HOSPITAL | Age: 88
Discharge: HOME | End: 2023-02-13
Admitting: SPECIALIST
Payer: MEDICARE

## 2023-02-13 DIAGNOSIS — I35.1 NONRHEUMATIC AORTIC VALVE REGURGITATION: ICD-10-CM

## 2023-02-13 DIAGNOSIS — I25.84 CORONARY ARTERY DISEASE DUE TO CALCIFIED CORONARY LESION: ICD-10-CM

## 2023-02-13 DIAGNOSIS — I25.10 CORONARY ARTERY DISEASE DUE TO CALCIFIED CORONARY LESION: ICD-10-CM

## 2023-02-13 DIAGNOSIS — I25.5 ISCHEMIC CARDIOMYOPATHY: ICD-10-CM

## 2023-02-13 LAB
BH CV ECHO MEAS - ACS: 1.59 CM
BH CV ECHO MEAS - AI P1/2T: 528.6 MSEC
BH CV ECHO MEAS - AO MAX PG: 9.6 MMHG
BH CV ECHO MEAS - AO MEAN PG: 5.1 MMHG
BH CV ECHO MEAS - AO ROOT DIAM: 2.8 CM
BH CV ECHO MEAS - AO V2 MAX: 154.7 CM/SEC
BH CV ECHO MEAS - AO V2 VTI: 31.1 CM
BH CV ECHO MEAS - EDV(CUBED): 88.1 ML
BH CV ECHO MEAS - EDV(MOD-SP4): 64.4 ML
BH CV ECHO MEAS - EF(MOD-SP4): 55.3 %
BH CV ECHO MEAS - EF_3D-VOL: 55 %
BH CV ECHO MEAS - ESV(CUBED): 19.2 ML
BH CV ECHO MEAS - ESV(MOD-SP4): 28.8 ML
BH CV ECHO MEAS - FS: 39.8 %
BH CV ECHO MEAS - IVS/LVPW: 0.92 CM
BH CV ECHO MEAS - IVSD: 1.22 CM
BH CV ECHO MEAS - LA DIMENSION: 3.5 CM
BH CV ECHO MEAS - LAT PEAK E' VEL: 5.1 CM/SEC
BH CV ECHO MEAS - LV DIASTOLIC VOL/BSA (35-75): 39.7 CM2
BH CV ECHO MEAS - LV MASS(C)D: 212.7 GRAMS
BH CV ECHO MEAS - LV SYSTOLIC VOL/BSA (12-30): 17.7 CM2
BH CV ECHO MEAS - LVIDD: 4.5 CM
BH CV ECHO MEAS - LVIDS: 2.7 CM
BH CV ECHO MEAS - LVOT AREA: 3.3 CM2
BH CV ECHO MEAS - LVOT DIAM: 2.06 CM
BH CV ECHO MEAS - LVPWD: 1.33 CM
BH CV ECHO MEAS - MED PEAK E' VEL: 5.8 CM/SEC
BH CV ECHO MEAS - MV A MAX VEL: 110.1 CM/SEC
BH CV ECHO MEAS - MV DEC SLOPE: 260.2 CM/SEC2
BH CV ECHO MEAS - MV E MAX VEL: 68.6 CM/SEC
BH CV ECHO MEAS - MV E/A: 0.62
BH CV ECHO MEAS - RAP SYSTOLE: 10 MMHG
BH CV ECHO MEAS - RVDD: 2.9 CM
BH CV ECHO MEAS - RVSP: 30.3 MMHG
BH CV ECHO MEAS - SI(MOD-SP4): 21.9 ML/M2
BH CV ECHO MEAS - SV(MOD-SP4): 35.6 ML
BH CV ECHO MEAS - TR MAX PG: 20.3 MMHG
BH CV ECHO MEAS - TR MAX VEL: 225.3 CM/SEC
BH CV ECHO MEASUREMENTS AVERAGE E/E' RATIO: 12.59
LEFT ATRIUM VOLUME INDEX: 22.8 ML/M2
MAXIMAL PREDICTED HEART RATE: 135 BPM
STRESS TARGET HR: 115 BPM

## 2023-02-13 PROCEDURE — 93306 TTE W/DOPPLER COMPLETE: CPT | Performed by: SPECIALIST

## 2023-02-13 PROCEDURE — 93306 TTE W/DOPPLER COMPLETE: CPT

## 2023-02-15 ENCOUNTER — LAB (OUTPATIENT)
Dept: LAB | Facility: HOSPITAL | Age: 88
End: 2023-02-15
Payer: MEDICARE

## 2023-02-15 DIAGNOSIS — E78.5 DYSLIPIDEMIA: ICD-10-CM

## 2023-02-15 LAB
ALBUMIN SERPL-MCNC: 3.9 G/DL (ref 3.5–5.2)
ALBUMIN/GLOB SERPL: 1 G/DL
ALP SERPL-CCNC: 111 U/L (ref 39–117)
ALT SERPL W P-5'-P-CCNC: 36 U/L (ref 1–33)
ANION GAP SERPL CALCULATED.3IONS-SCNC: 10.1 MMOL/L (ref 5–15)
AST SERPL-CCNC: 28 U/L (ref 1–32)
BILIRUB SERPL-MCNC: 0.6 MG/DL (ref 0–1.2)
BUN SERPL-MCNC: 16 MG/DL (ref 8–23)
BUN/CREAT SERPL: 15.8 (ref 7–25)
CALCIUM SPEC-SCNC: 9.7 MG/DL (ref 8.6–10.5)
CHLORIDE SERPL-SCNC: 106 MMOL/L (ref 98–107)
CHOLEST SERPL-MCNC: 117 MG/DL (ref 0–200)
CO2 SERPL-SCNC: 25.9 MMOL/L (ref 22–29)
CREAT SERPL-MCNC: 1.01 MG/DL (ref 0.57–1)
EGFRCR SERPLBLD CKD-EPI 2021: 54.7 ML/MIN/1.73
GLOBULIN UR ELPH-MCNC: 3.8 GM/DL
GLUCOSE SERPL-MCNC: 98 MG/DL (ref 65–99)
HDLC SERPL-MCNC: 51 MG/DL (ref 40–60)
LDLC SERPL CALC-MCNC: 45 MG/DL (ref 0–100)
LDLC/HDLC SERPL: 0.85 {RATIO}
POTASSIUM SERPL-SCNC: 4.8 MMOL/L (ref 3.5–5.2)
PROT SERPL-MCNC: 7.7 G/DL (ref 6–8.5)
SODIUM SERPL-SCNC: 142 MMOL/L (ref 136–145)
TRIGL SERPL-MCNC: 114 MG/DL (ref 0–150)
VLDLC SERPL-MCNC: 21 MG/DL (ref 5–40)

## 2023-02-15 PROCEDURE — 80061 LIPID PANEL: CPT | Performed by: SPECIALIST

## 2023-02-15 PROCEDURE — 80053 COMPREHEN METABOLIC PANEL: CPT | Performed by: SPECIALIST

## 2023-03-12 DIAGNOSIS — I10 ESSENTIAL HYPERTENSION: ICD-10-CM

## 2023-03-13 RX ORDER — TICAGRELOR 90 MG/1
TABLET ORAL
Qty: 180 TABLET | Refills: 1 | OUTPATIENT
Start: 2023-03-13

## 2023-03-15 ENCOUNTER — OFFICE VISIT (OUTPATIENT)
Dept: CARDIOLOGY | Facility: CLINIC | Age: 88
End: 2023-03-15
Payer: MEDICARE

## 2023-03-15 VITALS
OXYGEN SATURATION: 97 % | HEIGHT: 64 IN | WEIGHT: 127 LBS | BODY MASS INDEX: 21.68 KG/M2 | DIASTOLIC BLOOD PRESSURE: 56 MMHG | HEART RATE: 42 BPM | SYSTOLIC BLOOD PRESSURE: 119 MMHG

## 2023-03-15 DIAGNOSIS — Z95.5 HISTORY OF CORONARY ANGIOPLASTY WITH INSERTION OF STENT: ICD-10-CM

## 2023-03-15 DIAGNOSIS — I49.3 PREMATURE VENTRICULAR BEAT: Primary | ICD-10-CM

## 2023-03-15 DIAGNOSIS — I24.0 RCA OCCLUSION: ICD-10-CM

## 2023-03-15 DIAGNOSIS — Z86.718 HISTORY OF DVT (DEEP VEIN THROMBOSIS): ICD-10-CM

## 2023-03-15 DIAGNOSIS — I35.1 NONRHEUMATIC AORTIC VALVE REGURGITATION: ICD-10-CM

## 2023-03-15 DIAGNOSIS — I10 ESSENTIAL HYPERTENSION: ICD-10-CM

## 2023-03-15 DIAGNOSIS — E78.5 DYSLIPIDEMIA: ICD-10-CM

## 2023-03-15 DIAGNOSIS — I25.5 ISCHEMIC CARDIOMYOPATHY: ICD-10-CM

## 2023-03-15 DIAGNOSIS — F17.200 SMOKER: ICD-10-CM

## 2023-03-15 DIAGNOSIS — I25.84 CORONARY ARTERY DISEASE DUE TO CALCIFIED CORONARY LESION: ICD-10-CM

## 2023-03-15 DIAGNOSIS — I25.10 CORONARY ARTERY DISEASE DUE TO CALCIFIED CORONARY LESION: ICD-10-CM

## 2023-03-15 PROBLEM — Z86.73 HISTORY OF CVA (CEREBROVASCULAR ACCIDENT): Status: RESOLVED | Noted: 2022-06-10 | Resolved: 2023-03-15

## 2023-03-15 PROCEDURE — 99214 OFFICE O/P EST MOD 30 MIN: CPT | Performed by: SPECIALIST

## 2023-03-15 PROCEDURE — 3078F DIAST BP <80 MM HG: CPT | Performed by: SPECIALIST

## 2023-03-15 PROCEDURE — 93000 ELECTROCARDIOGRAM COMPLETE: CPT | Performed by: SPECIALIST

## 2023-03-15 PROCEDURE — 3074F SYST BP LT 130 MM HG: CPT | Performed by: SPECIALIST

## 2023-03-15 RX ORDER — METOPROLOL SUCCINATE 50 MG/1
50 TABLET, EXTENDED RELEASE ORAL DAILY
Qty: 90 TABLET | Refills: 1 | Status: SHIPPED | OUTPATIENT
Start: 2023-03-15

## 2023-03-15 RX ORDER — SACUBITRIL AND VALSARTAN 24; 26 MG/1; MG/1
1 TABLET, FILM COATED ORAL 2 TIMES DAILY
Qty: 180 TABLET | Refills: 1 | Status: SHIPPED | OUTPATIENT
Start: 2023-03-15

## 2023-03-15 RX ORDER — ATORVASTATIN CALCIUM 80 MG/1
80 TABLET, FILM COATED ORAL
Qty: 90 TABLET | Refills: 1 | Status: SHIPPED | OUTPATIENT
Start: 2023-03-15

## 2023-03-15 RX ORDER — METOPROLOL SUCCINATE 50 MG/1
50 TABLET, EXTENDED RELEASE ORAL DAILY
Qty: 90 TABLET | Refills: 1 | Status: SHIPPED | OUTPATIENT
Start: 2023-03-15 | End: 2023-03-15 | Stop reason: SDUPTHER

## 2023-03-15 NOTE — PROGRESS NOTES
Subjective     Emerita Lew is a 87 y.o. female who presents to day for Follow-up (Here for testing f/u), Coronary Artery Disease, Hypertension, Cardiomyopathy, Deep Vein Thrombosis, and Cerebrovascular Accident.    CHIEF COMPLIANT  Chief Complaint   Patient presents with   • Follow-up     Here for testing f/u   • Coronary Artery Disease   • Hypertension   • Cardiomyopathy   • Deep Vein Thrombosis   • Cerebrovascular Accident     Problem list:  1.  Coronary artery disease.  1.1.  ST elevation MI/inferior wall MI with subsequent catheterization, 5/2022.  The patient had occlusion of the ostium of the RCA with subsequent stenting x2 for the same.  The patient had residual disease of approximately 85% in the circumflex.  1.2.  Elective intervention of the circumflex/OM disease, 7/2022.  The patient had stenting times one of the high-grade disease of the OM.  Previously placed stent to the RCA was patent.  The patient had minor disease otherwise.  2.  Ischemic cardiomyopathy.  2.1.  Estimated EF at time of inferior wall MI/ST elevation MI in May, 2022, reported at 40±5%.  2.2.  Ventriculography during catheterization, July, 2022, demonstrating an EF of 55%.  3.  Reported history of abdominal aortic aneurysm, but no aneurysmal formation noted on ultrasound, 7/2022.  4.  Carotid artery stenosis.  4.1.  Nonobstructive disease in bilateral carotid systems, less than 50%, per carotid duplex on 7/2022.  5.  Hypertension  6.  Dyslipidemia.  7.  History of CVA.  8.  History of DVT per report.    Problem List Items Addressed This Visit        Cardiac and Vasculature    Essential hypertension    Relevant Medications    metoprolol succinate XL (TOPROL-XL) 50 MG 24 hr tablet    Coronary artery disease due to calcified coronary lesion    Relevant Medications    metoprolol succinate XL (TOPROL-XL) 50 MG 24 hr tablet    Entresto 24-26 MG tablet    RCA occlusion (HCC)    Relevant Medications    metoprolol succinate XL (TOPROL-XL) 50  MG 24 hr tablet    Entresto 24-26 MG tablet    Ischemic cardiomyopathy    Relevant Medications    metoprolol succinate XL (TOPROL-XL) 50 MG 24 hr tablet    Entresto 24-26 MG tablet    Dyslipidemia    Relevant Medications    atorvastatin (LIPITOR) 80 MG tablet    Nonrheumatic aortic valve regurgitation    Relevant Medications    metoprolol succinate XL (TOPROL-XL) 50 MG 24 hr tablet    Entresto 24-26 MG tablet    History of coronary angioplasty with insertion of stent    Premature ventricular beat - Primary    Relevant Medications    metoprolol succinate XL (TOPROL-XL) 50 MG 24 hr tablet    Entresto 24-26 MG tablet    Other Relevant Orders    ECG 12 Lead       Coag and Thromboembolic    History of DVT (deep vein thrombosis)       Tobacco    Smoker       HPI    Been feeling a little bit weak and she gets quite dizzy when standing up from sitting position but there is no chest pain or shortness of breath or edema                  PRIOR MEDS  Current Outpatient Medications on File Prior to Visit   Medication Sig Dispense Refill   • aspirin 81 MG EC tablet Take 1 tablet by mouth Daily. 90 tablet 1   • [DISCONTINUED] atorvastatin (LIPITOR) 80 MG tablet Take 1 tablet by mouth every night at bedtime. 90 tablet 1   • [DISCONTINUED] Entresto 24-26 MG tablet Take 1 tablet by mouth 2 (Two) Times a Day. 180 tablet 1   • [DISCONTINUED] metoprolol succinate XL (TOPROL-XL) 50 MG 24 hr tablet Take 1 tablet by mouth Daily. (Patient taking differently: Take 25 mg by mouth Daily.) 90 tablet 1     No current facility-administered medications on file prior to visit.       ALLERGIES  Percocet [oxycodone-acetaminophen]    HISTORY  Past Medical History:   Diagnosis Date   • Aneurysm (HCC)     aortic   • Arthritis    • COPD (chronic obstructive pulmonary disease) ([facility])    • Coronary artery disease    • DVT (deep venous thrombosis) ([facility])    • Hyperlipidemia    • Hypertension    • Ischemic cardiomyopathy    • Myocardial infarction ([facility])      "stemi [facility] 5-       Social History     Socioeconomic History   • Marital status:    Tobacco Use   • Smoking status: Every Day   • Smokeless tobacco: Never   • Tobacco comments:     smoked a 3/4 PPD since age 18   Substance and Sexual Activity   • Alcohol use: No   • Drug use: No   • Sexual activity: Defer       Family History   Problem Relation Age of Onset   • Stroke Mother    • Lung cancer Father        Review of Systems   Constitutional: Positive for fatigue.   HENT: Negative.    Eyes: Positive for visual disturbance (reading glasses).   Respiratory: Negative.    Cardiovascular: Negative.    Gastrointestinal: Negative.    Endocrine: Negative.    Musculoskeletal: Positive for arthralgias and myalgias.   Skin: Negative.    Allergic/Immunologic: Positive for environmental allergies.   Neurological: Positive for dizziness. Negative for syncope.   Hematological: Bruises/bleeds easily.   Psychiatric/Behavioral: Positive for sleep disturbance.       Objective     VITALS: /56 (BP Location: Left arm, Patient Position: Sitting)   Pulse (!) 42 Comment: per EKG h/r 80, Bigeminy  Ht 162.6 cm (64.02\")   Wt 57.6 kg (127 lb)   SpO2 97%   BMI 21.79 kg/m²     LABS:   Lab Results (most recent)     None          IMAGING:   No Images in the past 120 days found..    EXAM:  Physical Exam  Vitals reviewed.   Constitutional:       Appearance: She is well-developed.   HENT:      Head: Normocephalic and atraumatic.   Eyes:      Pupils: Pupils are equal, round, and reactive to light.   Neck:      Thyroid: No thyromegaly.      Vascular: Carotid bruit present. No JVD.      Comments: Left carotid bruit  Cardiovascular:      Rate and Rhythm: Normal rate and regular rhythm.      Heart sounds: Normal heart sounds. No murmur heard.    No friction rub. No gallop.      Comments: Bigeminal rhythm  Pulmonary:      Effort: Pulmonary effort is normal. No respiratory distress.      Breath sounds: Normal breath sounds. No stridor. " No wheezing or rales.   Chest:      Chest wall: No tenderness.   Musculoskeletal:         General: No tenderness or deformity.      Cervical back: Neck supple.   Skin:     General: Skin is warm and dry.   Neurological:      Mental Status: She is alert and oriented to person, place, and time.      Cranial Nerves: No cranial nerve deficit.      Coordination: Coordination normal.         Procedure     ECG 12 Lead    Date/Time: 3/15/2023 3:39 PM  Performed by: Brandi Barboza MD  Authorized by: Brandi Barboza MD   Comparison: compared with previous ECG from 6/10/2022          EKG: Normal sinus rhythm with right axis deviation, rare premature atrial complexes and premature ventricular bigeminy's otherwise unremarkable EKG, compared with the EKG on 6/13/2022 now she is having bigeminy's instead of just occasional premature ventricular complexes       Assessment & Plan     Diagnoses and all orders for this visit:    1. Premature ventricular beat (Primary)  -     ECG 12 Lead    2. Coronary artery disease due to calcified coronary lesion    3. Dyslipidemia  -     atorvastatin (LIPITOR) 80 MG tablet; Take 1 tablet by mouth every night at bedtime.  Dispense: 90 tablet; Refill: 1    4. Essential hypertension    5. History of coronary angioplasty with insertion of stent    6. Nonrheumatic aortic valve regurgitation    7. RCA occlusion (HCC)    8. History of DVT (deep vein thrombosis)    9. Smoker    10. Ischemic cardiomyopathy  -     Discontinue: metoprolol succinate XL (TOPROL-XL) 50 MG 24 hr tablet; Take 1 tablet by mouth Daily.  Dispense: 90 tablet; Refill: 1  -     metoprolol succinate XL (TOPROL-XL) 50 MG 24 hr tablet; Take 1 tablet by mouth Daily.  Dispense: 90 tablet; Refill: 1  -     Entresto 24-26 MG tablet; Take 1 tablet by mouth 2 (Two) Times a Day.  Dispense: 180 tablet; Refill: 1    1.  She is having ventricular bigeminy's apparently her metoprolol was reduced to 25 mg/day because of concern of bradycardia but  actually she is not bradycardic but she has not PVC bigeminy so I am going to increase the dose back to from 25 mg/day to 50 mg/day  2.  We discussed the results of the echocardiogram with recovery of the left ventricular systolic function I will continue with the current dose of medications she is having some symptoms of postural hypotension advised her to be very careful standing of sitting position and also consider using a elastic stocking  3.  I reviewed her labs with excellent lipid profile, with LDL of 45, HDL 51 and triglycerides 114  4.  She now is taking her aspirin back every day  5.  She still smoking and I strongly advised her to quit smoking refused to get aids to try to help her quitting    Return in about 3 months (around 6/15/2023).      Advance Care Planning   ACP discussion was held with the patient during this visit. Patient has an advance directive in EMR which is still valid.              MEDS ORDERED DURING VISIT:  New Medications Ordered This Visit   Medications   • metoprolol succinate XL (TOPROL-XL) 50 MG 24 hr tablet     Sig: Take 1 tablet by mouth Daily.     Dispense:  90 tablet     Refill:  1     This prescription was filled on 9/28/2022. Any refills authorized will be placed on file.   • Entresto 24-26 MG tablet     Sig: Take 1 tablet by mouth 2 (Two) Times a Day.     Dispense:  180 tablet     Refill:  1   • atorvastatin (LIPITOR) 80 MG tablet     Sig: Take 1 tablet by mouth every night at bedtime.     Dispense:  90 tablet     Refill:  1     This prescription was filled on 9/28/2022. Any refills authorized will be placed on file.       As always, Federico Dave APRN  I appreciate very much the opportunity to participate in the cardiovascular care of your patients. Please do not hesitate to call me with any questions with regards to Emerita Lew evaluation and management.         This document has been electronically signed by Brandi Barboza MD  March 15, 2023 16:06 EDT    This  note is dictated utilizing voice recognition software.

## 2023-05-11 DIAGNOSIS — I10 ESSENTIAL HYPERTENSION: ICD-10-CM

## 2023-05-11 RX ORDER — TICAGRELOR 90 MG/1
TABLET ORAL
Qty: 180 TABLET | Refills: 1 | OUTPATIENT
Start: 2023-05-11

## 2023-05-24 ENCOUNTER — TELEPHONE (OUTPATIENT)
Dept: CARDIOLOGY | Facility: CLINIC | Age: 88
End: 2023-05-24
Payer: MEDICARE

## 2023-05-24 NOTE — TELEPHONE ENCOUNTER
With Cardiology (NURSE/MA [facility])  05/25/2023 at 4:00 PM        First attempt to reach pt.  No answer and VM not set up.   For the HUB to read to pt:   Please inform pt that Dr. Barboza said we can get her in for a nurse visit. She was added to tomorrow at 4pm.

## 2023-05-24 NOTE — TELEPHONE ENCOUNTER
"A woman LVM stating pt had stenting done within the last year and is continuing to have dizziness. Stated it's a daily issue and that they would like a sooner appt.   #[phone]      Per chart review, OV note from 3/15/23 states:  \"1.  She is having ventricular bigeminy's apparently her metoprolol was reduced to 25 mg/day because of concern of bradycardia but actually she is not bradycardic but she has not PVC bigeminy so I am going to increase the dose back to from 25 mg/day to 50 mg/day  2.  We discussed the results of the echocardiogram with recovery of the left ventricular systolic function I will continue with the current dose of medications she is having some symptoms of postural hypotension advised her to be very careful standing of sitting position and also consider using a elastic stocking.\"     Release on file for Yessica.        Called and spoke w/ pt, she stated her dizziness is worsening and that her BP is dropping. She stated she wants a sooner appt, stated \"as soon as possible.\" Stated she feels like these spells are going to make her pass out.   I asked pt is she's wearing stocking as suggested, she stated that she is.   I asked if she's still taking Metoprolol 50mg, she stated she is taking 25mg. I asked how BP is running as that's one of her main concerns.   100/58   75/40 77   96/48 75   79/44 74  76/49 40   90/45 81  I explained that I would send a message to Dr. Barboza to review and advise on. Pt expressed again that she would really like another appt ASAP.           "

## 2023-05-25 ENCOUNTER — CLINICAL SUPPORT (OUTPATIENT)
Dept: CARDIOLOGY | Facility: CLINIC | Age: 88
End: 2023-05-25

## 2023-05-25 VITALS
HEIGHT: 64 IN | OXYGEN SATURATION: 94 % | SYSTOLIC BLOOD PRESSURE: 131 MMHG | BODY MASS INDEX: 21.21 KG/M2 | WEIGHT: 124.2 LBS | HEART RATE: 37 BPM | DIASTOLIC BLOOD PRESSURE: 55 MMHG

## 2023-05-25 DIAGNOSIS — I95.1 ORTHOSTATIC HYPOTENSION: Primary | ICD-10-CM

## 2023-05-25 DIAGNOSIS — R42 DIZZINESS: ICD-10-CM

## 2023-05-25 NOTE — PROGRESS NOTES
I have reviewed the notes, assessments, and/or procedures performed, I concur with her/his documentation of Emerita Lew.

## 2023-05-25 NOTE — PROGRESS NOTES
"Emerita Lew  1935 5/25/2023   ?   Chief Complaint   Patient presents with   • Hypotension     Here for [place]   • Dizziness      ?   HPI:   ?   ? Patient had called in yest. With hypotensive home b/p readings of  70-90's / 40-50's and h/r 70-80 primarily. She c/o episodes of dizziness off and on and feels like she is \"going to pass out\", although no syncope. She states today she's pretty good, no sign. Dizziness. States for a long time some days are better or worse than orders. She describes dizziness orthostatic in nature due to only has sx with position changes. Denies any dizziness just sitting or if already up moving around. Also has episodes of having \"a shaky feeling\" but can visibly see mild shking of her head and extremities. She took her b/p prior to coming to the office and her b/p was 88/57, h/r 71. Confirms taking Metop. Succ. 25 mg daily and Entresto 24-26 po bid. Orthostatic b/p's done and noted in chart. Will call Dr. Barboza to discuss above. PH,LPN  ?     Current Outpatient Medications:   •  aspirin 81 MG EC tablet, Take 1 tablet by mouth Daily., Disp: 90 tablet, Rfl: 1  •  atorvastatin (LIPITOR) 80 MG tablet, Take 1 tablet by mouth every night at bedtime., Disp: 90 tablet, Rfl: 1  •  Entresto 24-26 MG tablet, Take 1 tablet by mouth 2 (Two) Times a Day., Disp: 180 tablet, Rfl: 1  •  metoprolol succinate XL (TOPROL-XL) 50 MG 24 hr tablet, Take 1 tablet by mouth Daily. (Patient taking differently: Take 25 mg by mouth Daily.), Disp: 90 tablet, Rfl: 1   ?   ?   Percocet [oxycodone-acetaminophen]       Procedures     ?   Assessment & Plan      1. Hypotension  2. Dizziness    T/O per Dr. Barboza to decrease Entresto to 1/2 tab po bid, monitor b/p for 2 weeks and then call the office with reading and sx's. Also instructed at next appt. To bring home b/p monitor to compare to the office one. Discussed and wrote down new entresto dose and her along with female  verbalized they understood. PH,LPN  ? "   ?   ?

## 2023-07-26 ENCOUNTER — OFFICE VISIT (OUTPATIENT)
Dept: CARDIOLOGY | Facility: CLINIC | Age: 88
End: 2023-07-26
Payer: MEDICARE

## 2023-07-26 VITALS
DIASTOLIC BLOOD PRESSURE: 62 MMHG | WEIGHT: 124.2 LBS | HEART RATE: 75 BPM | HEIGHT: 64 IN | SYSTOLIC BLOOD PRESSURE: 124 MMHG | OXYGEN SATURATION: 98 % | BODY MASS INDEX: 21.21 KG/M2

## 2023-07-26 DIAGNOSIS — I35.1 NONRHEUMATIC AORTIC VALVE REGURGITATION: ICD-10-CM

## 2023-07-26 DIAGNOSIS — R09.89 LEFT CAROTID BRUIT: ICD-10-CM

## 2023-07-26 DIAGNOSIS — Z95.5 HISTORY OF CORONARY ANGIOPLASTY WITH INSERTION OF STENT: ICD-10-CM

## 2023-07-26 DIAGNOSIS — I10 ESSENTIAL HYPERTENSION: ICD-10-CM

## 2023-07-26 DIAGNOSIS — F17.200 SMOKER: ICD-10-CM

## 2023-07-26 DIAGNOSIS — I25.10 CORONARY ARTERY DISEASE DUE TO CALCIFIED CORONARY LESION: Primary | ICD-10-CM

## 2023-07-26 DIAGNOSIS — I25.84 CORONARY ARTERY DISEASE DUE TO CALCIFIED CORONARY LESION: Primary | ICD-10-CM

## 2023-07-26 DIAGNOSIS — E78.5 DYSLIPIDEMIA: ICD-10-CM

## 2023-07-26 DIAGNOSIS — I25.5 ISCHEMIC CARDIOMYOPATHY: ICD-10-CM

## 2023-07-26 PROCEDURE — 99214 OFFICE O/P EST MOD 30 MIN: CPT | Performed by: SPECIALIST

## 2023-07-26 RX ORDER — SACUBITRIL AND VALSARTAN 24; 26 MG/1; MG/1
1 TABLET, FILM COATED ORAL 2 TIMES DAILY
Qty: 180 TABLET | Refills: 1 | Status: SHIPPED | OUTPATIENT
Start: 2023-07-26

## 2023-07-26 RX ORDER — METOPROLOL SUCCINATE 25 MG/1
25 TABLET, EXTENDED RELEASE ORAL DAILY
Qty: 90 TABLET | Refills: 1 | Status: SHIPPED | OUTPATIENT
Start: 2023-07-26

## 2023-07-26 RX ORDER — ATORVASTATIN CALCIUM 80 MG/1
80 TABLET, FILM COATED ORAL
Qty: 90 TABLET | Refills: 1 | Status: SHIPPED | OUTPATIENT
Start: 2023-07-26

## 2023-07-26 NOTE — PROGRESS NOTES
Subjective   Follow up, hypertension  Emerita Lew is a 88 y.o. female who presents to day for Follow-up (3mth F/U ).    CHIEF COMPLIANT  Chief Complaint   Patient presents with    Follow-up     3mth F/U        Active Problems:  Problem List Items Addressed This Visit          Cardiac and Vasculature    Essential hypertension    Relevant Medications    metoprolol succinate XL (TOPROL-XL) 25 MG 24 hr tablet    Coronary artery disease due to calcified coronary lesion - Primary    Relevant Medications    Entresto 24-26 MG tablet    metoprolol succinate XL (TOPROL-XL) 25 MG 24 hr tablet    Other Relevant Orders    Adult Transthoracic Echo Complete w/ Color, Spectral and Contrast if necessary per protocol    Ischemic cardiomyopathy    Relevant Medications    Entresto 24-26 MG tablet    metoprolol succinate XL (TOPROL-XL) 25 MG 24 hr tablet    Other Relevant Orders    Adult Transthoracic Echo Complete w/ Color, Spectral and Contrast if necessary per protocol    Left carotid bruit    Dyslipidemia    Relevant Medications    atorvastatin (LIPITOR) 80 MG tablet    Other Relevant Orders    Lipid Panel    Comprehensive Metabolic Panel    Nonrheumatic aortic valve regurgitation    Relevant Medications    Entresto 24-26 MG tablet    metoprolol succinate XL (TOPROL-XL) 25 MG 24 hr tablet    History of coronary angioplasty with insertion of stent       Tobacco    Smoker       HPI  HPI  She has been doing a lot better with a blood pressure now is better controlled at home no chest pain no shortness of breath no edema her main problem is when she stands up from sitting position sometimes gets she gets quite dizzy otherwise no new issues  PRIOR MEDS  Current Outpatient Medications on File Prior to Visit   Medication Sig Dispense Refill    aspirin 81 MG EC tablet Take 1 tablet by mouth Daily. 90 tablet 1    [DISCONTINUED] atorvastatin (LIPITOR) 80 MG tablet Take 1 tablet by mouth every night at bedtime. 90 tablet 1    [DISCONTINUED]  "Entresto 24-26 MG tablet Take 1 tablet by mouth 2 (Two) Times a Day. 180 tablet 1    [DISCONTINUED] metoprolol succinate XL (TOPROL-XL) 50 MG 24 hr tablet Take 1 tablet by mouth Daily. (Patient taking differently: Take 0.5 tablets by mouth Daily.) 90 tablet 1     No current facility-administered medications on file prior to visit.       ALLERGIES  Percocet [oxycodone-acetaminophen]    HISTORY  Past Medical History:   Diagnosis Date    Aneurysm     aortic    Arthritis     COPD (chronic obstructive pulmonary disease)     Coronary artery disease     DVT (deep venous thrombosis)     Hyperlipidemia     Hypertension     Ischemic cardiomyopathy     Myocardial infarction     stemi [facility] 5-       Social History     Socioeconomic History    Marital status:    Tobacco Use    Smoking status: Every Day    Smokeless tobacco: Never    Tobacco comments:     smoked a 3/4 PPD since age 18   Substance and Sexual Activity    Alcohol use: No    Drug use: No    Sexual activity: Defer       Family History   Problem Relation Age of Onset    Stroke Mother     Lung cancer Father        Review of Systems   Constitutional:  Negative for fatigue.   Respiratory:  Negative for apnea, cough, choking, chest tightness, shortness of breath, wheezing and stridor.    Cardiovascular:  Negative for chest pain, palpitations and leg swelling.   Musculoskeletal:  Positive for back pain.   Neurological:  Positive for dizziness (States she has dizzy spells). Negative for syncope, weakness, light-headedness, numbness and headaches.   Hematological:  Does not bruise/bleed easily.   Psychiatric/Behavioral:  Positive for sleep disturbance (Issues falling asleep).      Objective     VITALS: /62   Pulse 75   Ht 162.6 cm (64\")   Wt 56.3 kg (124 lb 3.2 oz)   SpO2 98%   BMI 21.32 kg/m²     LABS:   Lab Results (most recent)       None            IMAGING:   No Images in the past 120 days found..    EXAM:  Physical Exam  Vitals reviewed. "   Constitutional:       Appearance: She is well-developed.   HENT:      Head: Normocephalic and atraumatic.   Eyes:      Pupils: Pupils are equal, round, and reactive to light.   Neck:      Thyroid: No thyromegaly.      Vascular: No JVD.   Cardiovascular:      Rate and Rhythm: Normal rate and regular rhythm.      Heart sounds: Normal heart sounds. No murmur heard.    No friction rub. No gallop.   Pulmonary:      Effort: Pulmonary effort is normal. No respiratory distress.      Breath sounds: Normal breath sounds. No stridor. No wheezing or rales.   Chest:      Chest wall: No tenderness.   Musculoskeletal:         General: No tenderness or deformity.      Cervical back: Neck supple.   Skin:     General: Skin is warm and dry.   Neurological:      Mental Status: She is alert and oriented to person, place, and time.      Cranial Nerves: No cranial nerve deficit.      Coordination: Coordination normal.       Procedure   Procedures       Assessment & Plan     Diagnoses and all orders for this visit:    1. Coronary artery disease due to calcified coronary lesion (Primary)  -     Adult Transthoracic Echo Complete w/ Color, Spectral and Contrast if necessary per protocol; Future    2. Dyslipidemia  -     atorvastatin (LIPITOR) 80 MG tablet; Take 1 tablet by mouth every night at bedtime.  Dispense: 90 tablet; Refill: 1  -     Lipid Panel; Future  -     Comprehensive Metabolic Panel; Future    3. Essential hypertension    4. History of coronary angioplasty with insertion of stent    5. Left carotid bruit    6. Nonrheumatic aortic valve regurgitation    7. Smoker    8. Ischemic cardiomyopathy  -     Entresto 24-26 MG tablet; Take 1 tablet by mouth 2 (Two) Times a Day.  Dispense: 180 tablet; Refill: 1  -     metoprolol succinate XL (TOPROL-XL) 25 MG 24 hr tablet; Take 1 tablet by mouth Daily.  Dispense: 90 tablet; Refill: 1  -     Adult Transthoracic Echo Complete w/ Color, Spectral and Contrast if necessary per protocol;  Future    1.  Currently her blood pressure is excellent she is having some symptoms of postural hypotension with getting dizzy standing up from sitting position we talked about that we talked about being cautious and possibly using elastic stocking  2.  Regarding her history of ischemic cardiomyopathy her heart has recovered partly in the last echo I am going to repeat the echo before she comes next time for assessment for now we will continue with Entresto and metoprolol  3.  No evidence of CHF continue current management  4.  Carotid Doppler was last year with no critical stenosis  5.  We did talk about how important it is to quit smoking she wants to think about it  6.  Regarding her coronary artery disease she is asymptomatic right now we will continue with the daily aspirin 7.  I reviewed the labs from May with excellent lipid profile we will continue with the current dose of the atorvastatin      Return in about 6 months (around 1/26/2024).      Advance Care Planning   ACP discussion was held with the patient during this visit. Patient has an advance directive (not in EMR), copy requested.             MEDS ORDERED DURING VISIT:  New Medications Ordered This Visit   Medications    atorvastatin (LIPITOR) 80 MG tablet     Sig: Take 1 tablet by mouth every night at bedtime.     Dispense:  90 tablet     Refill:  1     This prescription was filled on 9/28/2022. Any refills authorized will be placed on file.    Entresto 24-26 MG tablet     Sig: Take 1 tablet by mouth 2 (Two) Times a Day.     Dispense:  180 tablet     Refill:  1    metoprolol succinate XL (TOPROL-XL) 25 MG 24 hr tablet     Sig: Take 1 tablet by mouth Daily.     Dispense:  90 tablet     Refill:  1     This prescription was filled on 9/28/2022. Any refills authorized will be placed on file.       As always, Federico Dave APRN  I appreciate very much the opportunity to participate in the cardiovascular care of your patients. Please do not  hesitate to call me with any questions with regards to Emeritakanika TillmanLew evaluation and management.         This document has been electronically signed by Brandi Barboza MD  July 26, 2023 15:53 EDT    This note is dictated utilizing voice recognition software.

## 2023-08-28 ENCOUNTER — LAB (OUTPATIENT)
Dept: LAB | Facility: HOSPITAL | Age: 88
End: 2023-08-28
Payer: MEDICARE

## 2023-08-28 ENCOUNTER — HOSPITAL ENCOUNTER (OUTPATIENT)
Dept: CARDIOLOGY | Facility: HOSPITAL | Age: 88
Discharge: HOME | End: 2023-08-28
Payer: MEDICARE

## 2023-08-28 DIAGNOSIS — I25.10 CORONARY ARTERY DISEASE DUE TO CALCIFIED CORONARY LESION: ICD-10-CM

## 2023-08-28 DIAGNOSIS — I25.5 ISCHEMIC CARDIOMYOPATHY: ICD-10-CM

## 2023-08-28 DIAGNOSIS — I25.84 CORONARY ARTERY DISEASE DUE TO CALCIFIED CORONARY LESION: ICD-10-CM

## 2023-08-28 DIAGNOSIS — E78.5 DYSLIPIDEMIA: ICD-10-CM

## 2023-08-28 LAB
ALBUMIN SERPL-MCNC: 4.3 G/DL (ref 3.5–5.2)
ALBUMIN/GLOB SERPL: 1.2 G/DL
ALP SERPL-CCNC: 97 U/L (ref 39–117)
ALT SERPL W P-5'-P-CCNC: 13 U/L (ref 1–33)
ANION GAP SERPL CALCULATED.3IONS-SCNC: 12 MMOL/L (ref 5–15)
AST SERPL-CCNC: 18 U/L (ref 1–32)
BH CV ECHO MEAS - ACS: 1.77 CM
BH CV ECHO MEAS - AI P1/2T: 621 MSEC
BH CV ECHO MEAS - AO MAX PG: 7.2 MMHG
BH CV ECHO MEAS - AO MEAN PG: 4.4 MMHG
BH CV ECHO MEAS - AO ROOT DIAM: 2.9 CM
BH CV ECHO MEAS - AO V2 MAX: 134.4 CM/SEC
BH CV ECHO MEAS - AO V2 VTI: 35.7 CM
BH CV ECHO MEAS - EDV(CUBED): 103.2 ML
BH CV ECHO MEAS - EDV(MOD-SP4): 55.3 ML
BH CV ECHO MEAS - EF(MOD-SP4): 51.2 %
BH CV ECHO MEAS - ESV(CUBED): 19 ML
BH CV ECHO MEAS - ESV(MOD-SP4): 27 ML
BH CV ECHO MEAS - FS: 43.1 %
BH CV ECHO MEAS - IVS/LVPW: 0.99 CM
BH CV ECHO MEAS - IVSD: 1.32 CM
BH CV ECHO MEAS - LA DIMENSION: 3.2 CM
BH CV ECHO MEAS - LAT PEAK E' VEL: 7.5 CM/SEC
BH CV ECHO MEAS - LV DIASTOLIC VOL/BSA (35-75): 34.6 CM2
BH CV ECHO MEAS - LV MASS(C)D: 243.8 GRAMS
BH CV ECHO MEAS - LV SYSTOLIC VOL/BSA (12-30): 16.9 CM2
BH CV ECHO MEAS - LVIDD: 4.7 CM
BH CV ECHO MEAS - LVIDS: 2.7 CM
BH CV ECHO MEAS - LVPWD: 1.33 CM
BH CV ECHO MEAS - MED PEAK E' VEL: 6.7 CM/SEC
BH CV ECHO MEAS - MV A MAX VEL: 126 CM/SEC
BH CV ECHO MEAS - MV DEC TIME: 0.2 MSEC
BH CV ECHO MEAS - MV E MAX VEL: 79.3 CM/SEC
BH CV ECHO MEAS - MV E/A: 0.63
BH CV ECHO MEAS - RAP SYSTOLE: 10 MMHG
BH CV ECHO MEAS - RVSP: 32.1 MMHG
BH CV ECHO MEAS - SI(MOD-SP4): 17.7 ML/M2
BH CV ECHO MEAS - SV(MOD-SP4): 28.3 ML
BH CV ECHO MEAS - TR MAX PG: 22.1 MMHG
BH CV ECHO MEAS - TR MAX VEL: 234.8 CM/SEC
BH CV ECHO MEASUREMENTS AVERAGE E/E' RATIO: 11.17
BH CV XLRA - RV BASE: 2.5 CM
BH CV XLRA - RV LENGTH: 6.5 CM
BH CV XLRA - RV MID: 2.02 CM
BILIRUB SERPL-MCNC: 0.5 MG/DL (ref 0–1.2)
BUN SERPL-MCNC: 20 MG/DL (ref 8–23)
BUN/CREAT SERPL: 17.1 (ref 7–25)
CALCIUM SPEC-SCNC: 9.8 MG/DL (ref 8.6–10.5)
CHLORIDE SERPL-SCNC: 105 MMOL/L (ref 98–107)
CHOLEST SERPL-MCNC: 119 MG/DL (ref 0–200)
CO2 SERPL-SCNC: 24 MMOL/L (ref 22–29)
CREAT SERPL-MCNC: 1.17 MG/DL (ref 0.57–1)
EGFRCR SERPLBLD CKD-EPI 2021: 45.5 ML/MIN/1.73
GLOBULIN UR ELPH-MCNC: 3.7 GM/DL
GLUCOSE SERPL-MCNC: 102 MG/DL (ref 65–99)
HDLC SERPL-MCNC: 55 MG/DL (ref 40–60)
LDLC SERPL CALC-MCNC: 46 MG/DL (ref 0–100)
LDLC/HDLC SERPL: 0.83 {RATIO}
LEFT ATRIUM VOLUME INDEX: 21.4 ML/M2
POTASSIUM SERPL-SCNC: 5.5 MMOL/L (ref 3.5–5.2)
PROT SERPL-MCNC: 8 G/DL (ref 6–8.5)
SODIUM SERPL-SCNC: 141 MMOL/L (ref 136–145)
TRIGL SERPL-MCNC: 92 MG/DL (ref 0–150)
VLDLC SERPL-MCNC: 18 MG/DL (ref 5–40)

## 2023-08-28 PROCEDURE — 93306 TTE W/DOPPLER COMPLETE: CPT | Performed by: SPECIALIST

## 2023-08-28 PROCEDURE — 93306 TTE W/DOPPLER COMPLETE: CPT

## 2023-08-28 PROCEDURE — 80053 COMPREHEN METABOLIC PANEL: CPT | Performed by: SPECIALIST

## 2023-08-28 PROCEDURE — 80061 LIPID PANEL: CPT | Performed by: SPECIALIST

## 2024-02-14 ENCOUNTER — OFFICE VISIT (OUTPATIENT)
Dept: CARDIOLOGY | Facility: CLINIC | Age: 89
End: 2024-02-14
Payer: MEDICARE

## 2024-02-14 VITALS
OXYGEN SATURATION: 97 % | HEIGHT: 64 IN | DIASTOLIC BLOOD PRESSURE: 70 MMHG | WEIGHT: 131.2 LBS | BODY MASS INDEX: 22.4 KG/M2 | SYSTOLIC BLOOD PRESSURE: 138 MMHG | HEART RATE: 52 BPM

## 2024-02-14 DIAGNOSIS — F17.200 SMOKER: ICD-10-CM

## 2024-02-14 DIAGNOSIS — I25.5 ISCHEMIC CARDIOMYOPATHY: ICD-10-CM

## 2024-02-14 DIAGNOSIS — I25.10 CORONARY ARTERY DISEASE DUE TO CALCIFIED CORONARY LESION: Primary | ICD-10-CM

## 2024-02-14 DIAGNOSIS — I10 ESSENTIAL HYPERTENSION: ICD-10-CM

## 2024-02-14 DIAGNOSIS — I35.1 NONRHEUMATIC AORTIC VALVE REGURGITATION: ICD-10-CM

## 2024-02-14 DIAGNOSIS — Z86.718 HISTORY OF DVT (DEEP VEIN THROMBOSIS): ICD-10-CM

## 2024-02-14 DIAGNOSIS — R09.89 LEFT CAROTID BRUIT: ICD-10-CM

## 2024-02-14 DIAGNOSIS — E78.5 DYSLIPIDEMIA: ICD-10-CM

## 2024-02-14 DIAGNOSIS — I25.84 CORONARY ARTERY DISEASE DUE TO CALCIFIED CORONARY LESION: Primary | ICD-10-CM

## 2024-02-14 DIAGNOSIS — Z86.73 HISTORY OF STROKE: ICD-10-CM

## 2024-02-14 PROBLEM — I71.9 AORTIC ANEURYSM WITHOUT RUPTURE: Status: RESOLVED | Noted: 2022-06-10 | Resolved: 2024-02-14

## 2024-02-14 PROCEDURE — 99214 OFFICE O/P EST MOD 30 MIN: CPT | Performed by: SPECIALIST

## 2024-02-14 RX ORDER — METOPROLOL SUCCINATE 25 MG/1
25 TABLET, EXTENDED RELEASE ORAL DAILY
Qty: 90 TABLET | Refills: 1 | Status: SHIPPED | OUTPATIENT
Start: 2024-02-14

## 2024-02-14 RX ORDER — ATORVASTATIN CALCIUM 80 MG/1
80 TABLET, FILM COATED ORAL
Qty: 90 TABLET | Refills: 1 | Status: SHIPPED | OUTPATIENT
Start: 2024-02-14

## 2024-02-14 RX ORDER — SACUBITRIL AND VALSARTAN 24; 26 MG/1; MG/1
1 TABLET, FILM COATED ORAL 2 TIMES DAILY
Qty: 180 TABLET | Refills: 1 | Status: SHIPPED | OUTPATIENT
Start: 2024-02-14

## 2024-02-29 ENCOUNTER — HOSPITAL ENCOUNTER (OUTPATIENT)
Dept: CARDIOLOGY | Facility: HOSPITAL | Age: 89
Discharge: HOME | End: 2024-02-29
Admitting: SPECIALIST
Payer: MEDICARE

## 2024-02-29 DIAGNOSIS — R09.89 LEFT CAROTID BRUIT: ICD-10-CM

## 2024-02-29 PROCEDURE — 93880 EXTRACRANIAL BILAT STUDY: CPT

## 2024-05-14 ENCOUNTER — LAB (OUTPATIENT)
Dept: LAB | Facility: HOSPITAL | Age: 89
End: 2024-05-14
Payer: MEDICARE

## 2024-05-14 DIAGNOSIS — E78.5 DYSLIPIDEMIA: ICD-10-CM

## 2024-05-14 LAB
ALBUMIN SERPL-MCNC: 4 G/DL (ref 3.5–5.2)
ALBUMIN/GLOB SERPL: 1.1 G/DL
ALP SERPL-CCNC: 91 U/L (ref 39–117)
ALT SERPL W P-5'-P-CCNC: 10 U/L (ref 1–33)
ANION GAP SERPL CALCULATED.3IONS-SCNC: 10.1 MMOL/L (ref 5–15)
AST SERPL-CCNC: 17 U/L (ref 1–32)
BILIRUB SERPL-MCNC: 0.4 MG/DL (ref 0–1.2)
BUN SERPL-MCNC: 21 MG/DL (ref 8–23)
BUN/CREAT SERPL: 18.4 (ref 7–25)
CALCIUM SPEC-SCNC: 9.7 MG/DL (ref 8.6–10.5)
CHLORIDE SERPL-SCNC: 105 MMOL/L (ref 98–107)
CHOLEST SERPL-MCNC: 227 MG/DL (ref 0–200)
CO2 SERPL-SCNC: 23.9 MMOL/L (ref 22–29)
CREAT SERPL-MCNC: 1.14 MG/DL (ref 0.57–1)
EGFRCR SERPLBLD CKD-EPI 2021: 47 ML/MIN/1.73
GLOBULIN UR ELPH-MCNC: 3.6 GM/DL
GLUCOSE SERPL-MCNC: 98 MG/DL (ref 65–99)
HDLC SERPL-MCNC: 58 MG/DL (ref 40–60)
LDLC SERPL CALC-MCNC: 146 MG/DL (ref 0–100)
LDLC/HDLC SERPL: 2.47 {RATIO}
POTASSIUM SERPL-SCNC: 4.9 MMOL/L (ref 3.5–5.2)
PROT SERPL-MCNC: 7.6 G/DL (ref 6–8.5)
SODIUM SERPL-SCNC: 139 MMOL/L (ref 136–145)
TRIGL SERPL-MCNC: 128 MG/DL (ref 0–150)
VLDLC SERPL-MCNC: 23 MG/DL (ref 5–40)

## 2024-05-14 PROCEDURE — 36415 COLL VENOUS BLD VENIPUNCTURE: CPT

## 2024-05-14 PROCEDURE — 80053 COMPREHEN METABOLIC PANEL: CPT

## 2024-05-14 PROCEDURE — 80061 LIPID PANEL: CPT

## 2024-05-16 ENCOUNTER — TELEPHONE (OUTPATIENT)
Dept: CARDIOLOGY | Facility: CLINIC | Age: 89
End: 2024-05-16
Payer: MEDICARE

## 2024-05-16 NOTE — TELEPHONE ENCOUNTER
[facility] called to report patient has quit taking Atorvastatin 80 mg due to joint pain. She does feel she needs anything for cholesterol.

## 2024-05-20 ENCOUNTER — TELEPHONE (OUTPATIENT)
Dept: CARDIOLOGY | Facility: CLINIC | Age: 89
End: 2024-05-20
Payer: MEDICARE

## 2024-05-20 NOTE — TELEPHONE ENCOUNTER
Ken LVM stating pt came in to P/U her meds and didn't think she was supposed to be on both Metoprolol and entresto.       Per chart review, both are on pt's active rx list.         I called and informed px that pt needs both, they verbalized understanding.

## 2024-06-03 ENCOUNTER — OFFICE VISIT (OUTPATIENT)
Dept: CARDIOLOGY | Facility: CLINIC | Age: 89
End: 2024-06-03
Payer: MEDICARE

## 2024-06-03 VITALS
DIASTOLIC BLOOD PRESSURE: 65 MMHG | HEIGHT: 64 IN | SYSTOLIC BLOOD PRESSURE: 115 MMHG | HEART RATE: 65 BPM | BODY MASS INDEX: 23.63 KG/M2 | WEIGHT: 138.4 LBS | OXYGEN SATURATION: 98 %

## 2024-06-03 DIAGNOSIS — E78.5 DYSLIPIDEMIA: ICD-10-CM

## 2024-06-03 DIAGNOSIS — I63.9 CEREBROVASCULAR ACCIDENT (CVA), UNSPECIFIED MECHANISM: ICD-10-CM

## 2024-06-03 DIAGNOSIS — R09.89 LEFT CAROTID BRUIT: ICD-10-CM

## 2024-06-03 DIAGNOSIS — Z86.718 HISTORY OF DVT (DEEP VEIN THROMBOSIS): ICD-10-CM

## 2024-06-03 DIAGNOSIS — I25.10 CORONARY ARTERY DISEASE DUE TO CALCIFIED CORONARY LESION: ICD-10-CM

## 2024-06-03 DIAGNOSIS — I21.3 ST ELEVATION MYOCARDIAL INFARCTION (STEMI), UNSPECIFIED ARTERY: ICD-10-CM

## 2024-06-03 DIAGNOSIS — Z95.5 HISTORY OF CORONARY ANGIOPLASTY WITH INSERTION OF STENT: ICD-10-CM

## 2024-06-03 DIAGNOSIS — F17.200 SMOKER: ICD-10-CM

## 2024-06-03 DIAGNOSIS — I25.84 CORONARY ARTERY DISEASE DUE TO CALCIFIED CORONARY LESION: ICD-10-CM

## 2024-06-03 DIAGNOSIS — I24.0 RCA OCCLUSION: ICD-10-CM

## 2024-06-03 DIAGNOSIS — I10 ESSENTIAL HYPERTENSION: Primary | ICD-10-CM

## 2024-06-03 DIAGNOSIS — R07.2 PRECORDIAL PAIN: ICD-10-CM

## 2024-06-03 DIAGNOSIS — I25.5 ISCHEMIC CARDIOMYOPATHY: ICD-10-CM

## 2024-06-03 PROBLEM — Z86.73 HISTORY OF STROKE: Status: RESOLVED | Noted: 2022-06-10 | Resolved: 2024-06-03

## 2024-06-03 PROCEDURE — 99214 OFFICE O/P EST MOD 30 MIN: CPT | Performed by: SPECIALIST

## 2024-06-03 RX ORDER — METOPROLOL SUCCINATE 25 MG/1
25 TABLET, EXTENDED RELEASE ORAL DAILY
Qty: 90 TABLET | Refills: 1 | Status: SHIPPED | OUTPATIENT
Start: 2024-06-03

## 2024-06-03 RX ORDER — ROSUVASTATIN CALCIUM 20 MG/1
20 TABLET, COATED ORAL DAILY
Qty: 90 TABLET | Refills: 3 | Status: SHIPPED | OUTPATIENT
Start: 2024-06-03

## 2024-06-03 RX ORDER — VARENICLINE TARTRATE 0.5 (11)-1
KIT ORAL
Qty: 1 EACH | Refills: 0 | Status: SHIPPED | OUTPATIENT
Start: 2024-06-03 | End: 2024-07-01

## 2024-06-03 RX ORDER — SACUBITRIL AND VALSARTAN 24; 26 MG/1; MG/1
1 TABLET, FILM COATED ORAL 2 TIMES DAILY
Qty: 180 TABLET | Refills: 1 | Status: SHIPPED | OUTPATIENT
Start: 2024-06-03

## 2024-06-03 RX ORDER — VARENICLINE TARTRATE 1 MG/1
1 TABLET, FILM COATED ORAL 2 TIMES DAILY
Qty: 56 TABLET | Refills: 1 | Status: SHIPPED | OUTPATIENT
Start: 2024-07-01 | End: 2024-08-26

## 2024-06-03 NOTE — PROGRESS NOTES
Subjective   Follow up, CAD  Emerita Lew is a 88 y.o. female who presents to day for Follow-up (Here for 3  mo. Testing f/u), Coronary Artery Disease, Hypertension, Cardiomyopathy, Hyperlipidemia, Cerebrovascular Accident, and Deep Vein Thrombosis.    CHIEF COMPLIANT  Chief Complaint   Patient presents with    Follow-up     Here for 3  mo. Testing f/u    Coronary Artery Disease    Hypertension    Cardiomyopathy    Hyperlipidemia    Cerebrovascular Accident    Deep Vein Thrombosis     Problem list:  1.  Coronary artery disease.  1.1.  ST elevation MI/inferior wall MI with subsequent catheterization, 5/2022.  The patient had occlusion of the ostium of the RCA with subsequent stenting x2 for the same.  The patient had residual disease of approximately 85% in the circumflex.  1.2.  Elective intervention of the circumflex/OM disease, 7/2022.  The patient had stenting times one of the high-grade disease of the OM.  Previously placed stent to the RCA was patent.  The patient had minor disease otherwise.  2.  Ischemic cardiomyopathy.  2.1.  Estimated EF at time of inferior wall MI/ST elevation MI in May, 2022, reported at 40±5%.  2.2.  Ventriculography during catheterization, July, 2022, demonstrating an EF of 55%.  3.  Reported history of abdominal aortic aneurysm, but no aneurysmal formation noted on ultrasound, 7/2022.  4.  Carotid artery stenosis.  4.1.  Nonobstructive disease in bilateral carotid systems, less than 50%, per carotid duplex on 7/2022.  5.  Hypertension  6.  Dyslipidemia.  7.  History of CVA.  8.  History of DVT per report.    Problem List Items Addressed This Visit          Cardiac and Vasculature    Essential hypertension - Primary    Relevant Medications    metoprolol succinate XL (TOPROL-XL) 25 MG 24 hr tablet    Coronary artery disease due to calcified coronary lesion    Relevant Medications    Entresto 24-26 MG tablet    metoprolol succinate XL (TOPROL-XL) 25 MG 24 hr tablet    RCA occlusion     Relevant Medications    Entresto 24-26 MG tablet    metoprolol succinate XL (TOPROL-XL) 25 MG 24 hr tablet    STEMI (ST elevation myocardial infarction)    Relevant Medications    Entresto 24-26 MG tablet    metoprolol succinate XL (TOPROL-XL) 25 MG 24 hr tablet    Ischemic cardiomyopathy    Relevant Medications    Entresto 24-26 MG tablet    metoprolol succinate XL (TOPROL-XL) 25 MG 24 hr tablet    Precordial pain    Left carotid bruit    Dyslipidemia    Relevant Medications    rosuvastatin (CRESTOR) 20 MG tablet    Other Relevant Orders    Lipid Panel    Comprehensive Metabolic Panel    History of coronary angioplasty with insertion of stent       Coag and Thromboembolic    History of DVT (deep vein thrombosis)       Neuro    CVA (cerebral vascular accident)       Tobacco    Smoker    Relevant Medications    Varenicline Tartrate, Starter, 0.5 MG X 11 & 1 MG X 42 tablet therapy pack    varenicline (Chantix Continuing Month Pak) 1 MG tablet (Start on 7/1/2024)       HPI    She said that she is completely chest pain-free she is exercising every day and pedaling with no shortness of breath no edema or palpitations sometimes she gets slightly lightheaded but otherwise no new issues                  PRIOR MEDS  Current Outpatient Medications on File Prior to Visit   Medication Sig Dispense Refill    aspirin 81 MG EC tablet Take 1 tablet by mouth Daily. 90 tablet 1    [DISCONTINUED] Entresto 24-26 MG tablet Take 1 tablet by mouth 2 (Two) Times a Day. 180 tablet 1    [DISCONTINUED] metoprolol succinate XL (TOPROL-XL) 25 MG 24 hr tablet Take 1 tablet by mouth Daily. 90 tablet 1     No current facility-administered medications on file prior to visit.       ALLERGIES  Atorvastatin and Percocet [oxycodone-acetaminophen]    HISTORY  Past Medical History:   Diagnosis Date    Aneurysm     aortic    Arthritis     COPD (chronic obstructive pulmonary disease)     Coronary artery disease     DVT (deep venous thrombosis)      "Hyperlipidemia     Hypertension     Ischemic cardiomyopathy     Myocardial infarction     stemi [facility] 5-       Social History     Socioeconomic History    Marital status:    Tobacco Use    Smoking status: Every Day    Smokeless tobacco: Never    Tobacco comments:     smoked a 3/4 PPD since age 18   Substance and Sexual Activity    Alcohol use: No    Drug use: No    Sexual activity: Defer       Family History   Problem Relation Age of Onset    Stroke Mother     Lung cancer Father        Review of Systems   Constitutional:  Positive for fatigue.   HENT: Negative.     Eyes:  Positive for visual disturbance (glasses prn).   Respiratory: Negative.     Cardiovascular: Negative.    Gastrointestinal: Negative.    Endocrine: Negative.    Genitourinary: Negative.    Musculoskeletal:  Positive for arthralgias and myalgias.   Skin: Negative.    Allergic/Immunologic: Negative.    Neurological:  Positive for dizziness and light-headedness. Negative for syncope.   Hematological:  Bruises/bleeds easily.   Psychiatric/Behavioral:  Positive for sleep disturbance (off and on).        Objective     VITALS: /65 (BP Location: Left arm, Patient Position: Sitting)   Pulse 65   Ht 162.6 cm (64.02\")   Wt 62.8 kg (138 lb 6.4 oz)   SpO2 98%   BMI 23.74 kg/m²     LABS:   Lab Results (most recent)       None            IMAGING:   US Carotid Bilateral    Result Date: 2/29/2024  Impression:   1. No hemodynamically significant stenosis detected 2. Mild plaque bilaterally 3. Heterogeneity of the thyroid. Consider ultrasound of the thyroid.   This report was finalized on 2/29/2024 10:20 AM by Dr. Alok Ruth MD.        EXAM:  Physical Exam  Vitals reviewed.   Constitutional:       Appearance: She is well-developed.   HENT:      Head: Normocephalic and atraumatic.   Eyes:      Pupils: Pupils are equal, round, and reactive to light.   Neck:      Thyroid: No thyromegaly.      Vascular: Carotid bruit present. No JVD.      " Comments: Left carotid bruit  Cardiovascular:      Rate and Rhythm: Normal rate and regular rhythm.      Heart sounds: Normal heart sounds. No murmur heard.     No friction rub. No gallop.   Pulmonary:      Effort: Pulmonary effort is normal. No respiratory distress.      Breath sounds: Normal breath sounds. No stridor. No wheezing or rales.   Chest:      Chest wall: No tenderness.   Musculoskeletal:         General: No tenderness or deformity.      Cervical back: Neck supple.   Skin:     General: Skin is warm and dry.   Neurological:      Mental Status: She is alert and oriented to person, place, and time.      Cranial Nerves: No cranial nerve deficit.      Coordination: Coordination normal.         Procedure   Procedures        Assessment & Plan     Diagnoses and all orders for this visit:    1. Essential hypertension (Primary)    2. Coronary artery disease due to calcified coronary lesion    3. Dyslipidemia  -     rosuvastatin (CRESTOR) 20 MG tablet; Take 1 tablet by mouth Daily.  Dispense: 90 tablet; Refill: 3  -     Lipid Panel; Future  -     Comprehensive Metabolic Panel; Future    4. Ischemic cardiomyopathy  -     Entresto 24-26 MG tablet; Take 1 tablet by mouth 2 (Two) Times a Day.  Dispense: 180 tablet; Refill: 1  -     metoprolol succinate XL (TOPROL-XL) 25 MG 24 hr tablet; Take 1 tablet by mouth Daily.  Dispense: 90 tablet; Refill: 1    5. Precordial pain    6. RCA occlusion    7. ST elevation myocardial infarction (STEMI), unspecified artery    8. History of DVT (deep vein thrombosis)    9. Cerebrovascular accident (CVA), unspecified mechanism    10. Smoker  -     Varenicline Tartrate, Starter, 0.5 MG X 11 & 1 MG X 42 tablet therapy pack; Take 0.5 mg by mouth Daily for 3 days, THEN 0.5 mg 2 (Two) Times a Day for 4 days, THEN 1 mg 2 (Two) Times a Day for 21 days. Take 0.5 mg po daily x 3 days, then 0.5 mg po bid x 4 days, then 1 mg po bid  Dispense: 1 each; Refill: 0  -     varenicline (Chantix Continuing  Month Charles) 1 MG tablet; Take 1 tablet by mouth 2 (Two) Times a Day for 56 days.  Dispense: 56 tablet; Refill: 1    11. Left carotid bruit    12. History of coronary angioplasty with insertion of stent    1.  No chest pain at all we will continue the current management  2.  Again last echocardiac with recovery of of the systolic function we will continue with the current GDMT medication  3.  Unfortunately she quit taking atorvastatin her lipids quite elevated she is quit taking it because of joint pains I am starting her on rosuvastatin 20 mg/day and repeat labs prior to her next visit  4.  Blood pressure is well-controlled continue current management  5.  We talked about quitting smoking and will start her on Chantix and she states she will try to quit  6. Advised to discuss with PCP regarding US of thyroid  Return in about 6 months (around 12/3/2024).      Advance Care Planning   ACP discussion was held with the patient during this visit. Patient has an advance directive (not in EMR), copy requested.             MEDS ORDERED DURING VISIT:  New Medications Ordered This Visit   Medications    rosuvastatin (CRESTOR) 20 MG tablet     Sig: Take 1 tablet by mouth Daily.     Dispense:  90 tablet     Refill:  3    Entresto 24-26 MG tablet     Sig: Take 1 tablet by mouth 2 (Two) Times a Day.     Dispense:  180 tablet     Refill:  1    metoprolol succinate XL (TOPROL-XL) 25 MG 24 hr tablet     Sig: Take 1 tablet by mouth Daily.     Dispense:  90 tablet     Refill:  1     This prescription was filled on 9/28/2022. Any refills authorized will be placed on file.    Varenicline Tartrate, Starter, 0.5 MG X 11 & 1 MG X 42 tablet therapy pack     Sig: Take 0.5 mg by mouth Daily for 3 days, THEN 0.5 mg 2 (Two) Times a Day for 4 days, THEN 1 mg 2 (Two) Times a Day for 21 days. Take 0.5 mg po daily x 3 days, then 0.5 mg po bid x 4 days, then 1 mg po bid     Dispense:  1 each     Refill:  0    varenicline (Chantix Continuing Month Charles)  1 MG tablet     Sig: Take 1 tablet by mouth 2 (Two) Times a Day for 56 days.     Dispense:  56 tablet     Refill:  1       As always, Parul Painter APRN  I appreciate very much the opportunity to participate in the cardiovascular care of your patients. Please do not hesitate to call me with any questions with regards to Emerita Lew evaluation and management.         This document has been electronically signed by Brandi Barboza MD  Radha 3, 2024 15:16 EDT    This note is dictated utilizing voice recognition software.

## 2024-08-20 ENCOUNTER — APPOINTMENT (OUTPATIENT)
Dept: CT IMAGING | Facility: HOSPITAL | Age: 89
End: 2024-08-20
Payer: MEDICARE

## 2024-08-20 ENCOUNTER — HOSPITAL ENCOUNTER (EMERGENCY)
Facility: HOSPITAL | Age: 89
Discharge: HOME | End: 2024-08-20
Attending: EMERGENCY MEDICINE
Payer: MEDICARE

## 2024-08-20 VITALS
TEMPERATURE: 97.6 F | WEIGHT: 139 LBS | SYSTOLIC BLOOD PRESSURE: 154 MMHG | HEART RATE: 73 BPM | RESPIRATION RATE: 16 BRPM | HEIGHT: 64 IN | OXYGEN SATURATION: 99 % | BODY MASS INDEX: 23.73 KG/M2 | DIASTOLIC BLOOD PRESSURE: 94 MMHG

## 2024-08-20 DIAGNOSIS — L02.11 ABSCESS, NECK: Primary | ICD-10-CM

## 2024-08-20 LAB
HOLD SPECIMEN: NORMAL
WHOLE BLOOD HOLD COAG: NORMAL
WHOLE BLOOD HOLD SPECIMEN: NORMAL

## 2024-08-20 PROCEDURE — 25510000001 IOPAMIDOL 61 % SOLUTION: Performed by: EMERGENCY MEDICINE

## 2024-08-20 PROCEDURE — 70491 CT SOFT TISSUE NECK W/DYE: CPT

## 2024-08-20 PROCEDURE — 36415 COLL VENOUS BLD VENIPUNCTURE: CPT

## 2024-08-20 PROCEDURE — 99285 EMERGENCY DEPT VISIT HI MDM: CPT

## 2024-08-20 RX ORDER — IOPAMIDOL 612 MG/ML
100 INJECTION, SOLUTION INTRAVASCULAR
Status: COMPLETED | OUTPATIENT
Start: 2024-08-20 | End: 2024-08-20

## 2024-08-20 RX ORDER — SODIUM CHLORIDE 0.9 % (FLUSH) 0.9 %
10 SYRINGE (ML) INJECTION AS NEEDED
Status: DISCONTINUED | OUTPATIENT
Start: 2024-08-20 | End: 2024-08-20 | Stop reason: HOSPADM

## 2024-08-20 RX ORDER — LIDOCAINE HYDROCHLORIDE 10 MG/ML
10 INJECTION, SOLUTION EPIDURAL; INFILTRATION; INTRACAUDAL; PERINEURAL ONCE
Status: COMPLETED | OUTPATIENT
Start: 2024-08-20 | End: 2024-08-20

## 2024-08-20 RX ORDER — DOXYCYCLINE 100 MG/1
100 CAPSULE ORAL 2 TIMES DAILY
Qty: 20 CAPSULE | Refills: 0 | Status: SHIPPED | OUTPATIENT
Start: 2024-08-20

## 2024-08-20 RX ADMIN — LIDOCAINE HYDROCHLORIDE 10 ML: 10 INJECTION, SOLUTION EPIDURAL; INFILTRATION; INTRACAUDAL; PERINEURAL at 15:25

## 2024-08-20 RX ADMIN — IOPAMIDOL 75 ML: 612 INJECTION, SOLUTION INTRAVENOUS at 13:06

## 2024-08-20 NOTE — ED PROVIDER NOTES
Subjective   History of Present Illness  89-year-old female presents emergency department today with a mass of the right anterior neck.  Primary care doctor sent to the emergency department due to the concerns that the this was some type of cancer.  She reports that had a knot there for some time this began becoming red some mild surrounding redness no fevers no chills she is not immunocompromise.  She does not take an anticoagulant.  She has no other complaints.    History provided by:  Patient   used: No    Abscess  Abscess location: Right anterior neck.  Abscess quality: painful, redness and warmth    Red streaking: no    Duration:  2 weeks  Progression:  Worsening  Pain details:     Quality:  Dull    Severity:  Mild    Timing:  Constant    Progression:  Worsening  Chronicity:  New  Context: not diabetes, not injected drug use, not insect bite/sting and not skin injury    Relieved by:  Nothing  Worsened by:  Nothing  Ineffective treatments:  None tried  Associated symptoms: no anorexia, no fatigue, no fever, no headaches, no nausea and no vomiting    Risk factors: no hx of MRSA and no prior abscess        Review of Systems   Constitutional:  Negative for fatigue and fever.   Respiratory:  Negative for chest tightness and wheezing.    Cardiovascular:  Negative for chest pain and palpitations.   Gastrointestinal:  Negative for anorexia, nausea and vomiting.   Neurological:  Negative for headaches.       Past Medical History:   Diagnosis Date   • Aneurysm     aortic   • Arthritis    • COPD (chronic obstructive pulmonary disease)    • Coronary artery disease    • DVT (deep venous thrombosis)    • Hyperlipidemia    • Hypertension    • Ischemic cardiomyopathy    • Myocardial infarction     stemi [facility] 5-       Allergies   Allergen Reactions   • Atorvastatin Myalgia   • Percocet [Oxycodone-Acetaminophen] Rash       Past Surgical History:   Procedure Laterality Date   • CARDIAC CATHETERIZATION  Left 05/24/2022    PCI x 2 KALLIE to RCA   • CHOLECYSTECTOMY WITH INTRAOPERATIVE CHOLANGIOGRAM N/A 05/16/2021    Procedure: CHOLECYSTECTOMY LAPAROSCOPIC INTRAOPERATIVE CHOLANGIOGRAM;  Surgeon: Timo Monet MD;  Location: [facility];  Service: General;  Laterality: N/A;   • CORONARY STENT PLACEMENT  05/24/2022    [facility] setting STEMI,  RCA x2 KALLIE to RCA   • HYSTERECTOMY     • LEG SURGERY Left        Family History   Problem Relation Age of Onset   • Stroke Mother    • Lung cancer Father        Social History     Socioeconomic History   • Marital status:    Tobacco Use   • Smoking status: Every Day   • Smokeless tobacco: Never   • Tobacco comments:     smoked a 3/4 PPD since age 18   Substance and Sexual Activity   • Alcohol use: No   • Drug use: No   • Sexual activity: Defer           Objective   Physical Exam  Vitals and nursing note reviewed.   Constitutional:       General: She is not in acute distress.     Appearance: She is well-developed. She is not diaphoretic.   HENT:      Head: Normocephalic and atraumatic.      Nose: Nose normal.   Eyes:      General: No scleral icterus.     Conjunctiva/sclera: Conjunctivae normal.   Neck:     Cardiovascular:      Rate and Rhythm: Normal rate and regular rhythm.      Heart sounds: Normal heart sounds. No murmur heard.  Pulmonary:      Effort: Pulmonary effort is normal. No respiratory distress.      Breath sounds: Normal breath sounds.   Abdominal:      General: Bowel sounds are normal.      Palpations: Abdomen is soft.      Tenderness: There is no abdominal tenderness.   Musculoskeletal:         General: Normal range of motion.      Cervical back: Normal range of motion and neck supple.   Skin:     General: Skin is warm and dry.   Neurological:      Mental Status: She is alert and oriented to person, place, and time.   Psychiatric:         Behavior: Behavior normal.       Incision & Drainage    Date/Time: 8/20/2024 3:16 PM    Performed by: Alfredito Pandey,  PA  Authorized by: Juan Daniel Byrnes MD    Consent:     Consent obtained:  Verbal    Consent given by:  Patient    Risks, benefits, and alternatives were discussed: yes      Risks discussed:  Bleeding, incomplete drainage, pain, infection and damage to other organs    Alternatives discussed:  No treatment, delayed treatment, observation and referral  Universal protocol:     Procedure explained and questions answered to patient or proxy's satisfaction: yes      Relevant documents present and verified: yes      Test results available : yes      Imaging studies available: yes      Required blood products, implants, devices, and special equipment available: yes      Site/side marked: yes      Immediately prior to procedure, a time out was called: yes      Patient identity confirmed:  Verbally with patient, arm band, provided demographic data and anonymous protocol, patient vented/unresponsive  Location:     Type:  Abscess    Size:  3    Location:  Neck    Neck location:  R anterior  Pre-procedure details:     Skin preparation:  Povidone-iodine  Sedation:     Sedation type:  None  Anesthesia:     Anesthesia method:  Local infiltration    Local anesthetic:  Lidocaine 1% w/o epi  Procedure type:     Complexity:  Complex  Procedure details:     Ultrasound guidance: no      Needle aspiration: no      Incision types:  Single straight and stab incision    Incision depth:  Subcutaneous    Wound management:  Probed and deloculated and irrigated with saline    Drainage:  Purulent    Drainage amount:  Moderate    Wound treatment:  Wound left open    Packing materials:  1/4 in iodoform gauze  Post-procedure details:     Procedure completion:  Tolerated well, no immediate complications             ED Course  ED Course as of 08/20/24 1516   Tue Aug 20, 2024   1514 Due to the primary care doctors concern of some type of neoplasm versus abscess versus lymphadenitis.  CT of the neck was done with IV contrast revealed appears to be  an sebaceous cyst has become infected.  There is also nodules noted on bilateral carotid glands.  I discussed this with Dr. Radha HARDING on-call.  He states for patient of this age group and did not need follow-up. [MALORIE]      ED Course User Index  [MALORIE] Alfredito Pandey PA                                             Medical Decision Making  Amount and/or Complexity of Data Reviewed  Labs: ordered.  Radiology: ordered.    Risk  Prescription drug management.        Final diagnoses:   Abscess, neck       ED Disposition  ED Disposition       ED Disposition   Discharge    Condition   Stable    Comment   --               Parul Painter, APRN  [address]  [facility] [id number] [phone]          Parul Painter, APRN  [address]  [facility] [id number] [phone]               Medication List        New Prescriptions      doxycycline 100 MG capsule  Commonly known as: MONODOX  Take 1 capsule by mouth 2 (Two) Times a Day.            Stop      rosuvastatin 20 MG tablet  Commonly known as: CRESTOR     varenicline 1 MG tablet  Commonly known as: Chantix Continuing Month Charles               Where to Get Your Medications        These medications were sent to [facility] - [place], [place] - [address] - [phone]  - [phone] FX  [address], [facility] [address]      Phone: [phone]   doxycycline 100 MG capsule            Alfredito Pandey PA  08/23/24 6132

## 2024-09-10 ENCOUNTER — OFFICE VISIT (OUTPATIENT)
Age: 89
End: 2024-09-10
Payer: MEDICARE

## 2024-09-10 VITALS — HEIGHT: 64 IN | WEIGHT: 138 LBS | BODY MASS INDEX: 23.56 KG/M2

## 2024-09-10 DIAGNOSIS — L72.3 SEBACEOUS CYST: Primary | ICD-10-CM

## 2024-09-10 PROCEDURE — 1160F RVW MEDS BY RX/DR IN RCRD: CPT | Performed by: SURGERY

## 2024-09-10 PROCEDURE — 99203 OFFICE O/P NEW LOW 30 MIN: CPT | Performed by: SURGERY

## 2024-09-10 PROCEDURE — 1159F MED LIST DOCD IN RCRD: CPT | Performed by: SURGERY

## 2024-09-10 RX ORDER — IPRATROPIUM BROMIDE 42 UG/1
SPRAY, METERED NASAL
COMMUNITY
Start: 2024-09-04

## 2024-09-10 NOTE — PROGRESS NOTES
Date of Service: 9/10/2024    Subjective   Emerita Lew is a 89 y.o. female is being in consultation today at the request of Parul Painter APRN    Chief Complaint: Sebaceous cyst    Emerita Lew is a 89 y.o. female who presents with a sebaceous cyst in her right neck.  She had presented to the emergency department in [place] where the sebaceous cyst was lanced open and drained.  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  She was referred for further surgical discussion.    Of note, she is established with an ear nose and throat doctor for her chronic sinus issues.    Past Medical History:   Diagnosis Date    Aneurysm     aortic    Arthritis     COPD (chronic obstructive pulmonary disease)     Coronary artery disease     DVT (deep venous thrombosis)     Hyperlipidemia     Hypertension     Ischemic cardiomyopathy     Myocardial infarction     stemi [facility] 5-       Past Surgical History:   Procedure Laterality Date    CARDIAC CATHETERIZATION Left 05/24/2022    PCI x 2 KALLIE to RCA    CHOLECYSTECTOMY WITH INTRAOPERATIVE CHOLANGIOGRAM N/A 05/16/2021    Procedure: CHOLECYSTECTOMY LAPAROSCOPIC INTRAOPERATIVE CHOLANGIOGRAM;  Surgeon: Timo Monet MD;  Location: [facility];  Service: General;  Laterality: N/A;    CORONARY STENT PLACEMENT  05/24/2022    [facility] setting STEMI,  RCA x2 KALLIE to RCA    HYSTERECTOMY      LEG SURGERY Left          Family History   Problem Relation Age of Onset    Stroke Mother     Lung cancer Father          Social History     Socioeconomic History    Marital status:    Tobacco Use    Smoking status: Every Day    Smokeless tobacco: Never    Tobacco comments:     smoked a 3/4 PPD since age 18   Vaping Use    Vaping status: Never Used   Substance and Sexual Activity    Alcohol use: No    Drug use: No    Sexual activity: Defer         Review of Systems   Pertinent items are noted in HPI     Constitutional: No fevers, chills or  "malaise. No unintentional weight loss   Eyes: Denies visual changes    Cardiovascular: Denies chest pain, palpitations   Respiratory: Denies cough or shortness of breath   Abdominal/Gastrointestinal: No abdominal pain, no nausea/vomiting   Genitourinary: Denies dysuria or hematuria   Musculoskeletal: Denies any chronic joint aches, pains or deformities              Skin: Tender lesion in her posterior ear   Psychiatric: No recent mood changes   Neurologic: No paresthesias or loss of function        Objective       Physical Exam:      09/10/24  0931   Weight: 62.6 kg (138 lb)    Body mass index is 23.68 kg/m².  Constitution: Ht 162.6 cm (64.02\")   Wt 62.6 kg (138 lb)   BMI 23.68 kg/m²  . No acute distress  Head: Normocephalic, atraumatic.   Eyes: Aligned without strabismus. Conjunctivae noninjected   Ears, Nose, Mouth:no lesions noted  CV: Regular rate and rhythm   Respiratory: Symmetric chest expansion. No respiratory distress.   Gastrointestinal:  Soft, nontender, nondistended   Skin: Small palpable sebaceous cyst present on the right lateral neck, palpable nodularity posterior auricular region on the right.  Neurologic: No gross deficits.  Alert and oriented x3  Psychiatric: Normal mood      Assessment   Diagnoses and all orders for this visit:    1. Sebaceous cyst (Primary)      Emerita Lew is a 89 y.o. female with sebaceous cyst in her right neck.  She has only had 1 issue with this and is no longer having any issues, I recommended that she notify our clinic should she notice that it begins to cause her issues again.  We can address that with antibiotics and excision at that time should it continue to cause her problem.  In regards to the findings of bilateral salivary gland tumors on her CT scan, I have recommended that she speak with her established ear nose and throat surgeon regarding this finding.  I provided a copy of the CT scan report as her ear nose and throat surgeon is out of our hospital " system.    Follow-up as needed for sebaceous cyst on neck.         Skyla Meza MD  [facility]

## 2024-10-08 ENCOUNTER — LAB (OUTPATIENT)
Dept: LAB | Facility: HOSPITAL | Age: 89
End: 2024-10-08
Payer: MEDICARE

## 2024-10-08 DIAGNOSIS — E78.5 DYSLIPIDEMIA: ICD-10-CM

## 2024-10-08 LAB
ALBUMIN SERPL-MCNC: 4 G/DL (ref 3.5–5.2)
ALBUMIN/GLOB SERPL: 1.2 G/DL
ALP SERPL-CCNC: 86 U/L (ref 39–117)
ALT SERPL W P-5'-P-CCNC: 8 U/L (ref 1–33)
ANION GAP SERPL CALCULATED.3IONS-SCNC: 6.4 MMOL/L (ref 5–15)
AST SERPL-CCNC: 14 U/L (ref 1–32)
BILIRUB SERPL-MCNC: 0.4 MG/DL (ref 0–1.2)
BUN SERPL-MCNC: 16 MG/DL (ref 8–23)
BUN/CREAT SERPL: 13.2 (ref 7–25)
CALCIUM SPEC-SCNC: 9.3 MG/DL (ref 8.6–10.5)
CHLORIDE SERPL-SCNC: 106 MMOL/L (ref 98–107)
CHOLEST SERPL-MCNC: 233 MG/DL (ref 0–200)
CO2 SERPL-SCNC: 26.6 MMOL/L (ref 22–29)
CREAT SERPL-MCNC: 1.21 MG/DL (ref 0.57–1)
EGFRCR SERPLBLD CKD-EPI 2021: 43.5 ML/MIN/1.73
GLOBULIN UR ELPH-MCNC: 3.4 GM/DL
GLUCOSE SERPL-MCNC: 96 MG/DL (ref 65–99)
HDLC SERPL-MCNC: 53 MG/DL (ref 40–60)
LDLC SERPL CALC-MCNC: 154 MG/DL (ref 0–100)
LDLC/HDLC SERPL: 2.85 {RATIO}
POTASSIUM SERPL-SCNC: 4.6 MMOL/L (ref 3.5–5.2)
PROT SERPL-MCNC: 7.4 G/DL (ref 6–8.5)
SODIUM SERPL-SCNC: 139 MMOL/L (ref 136–145)
TRIGL SERPL-MCNC: 146 MG/DL (ref 0–150)
VLDLC SERPL-MCNC: 26 MG/DL (ref 5–40)

## 2024-10-08 PROCEDURE — 80061 LIPID PANEL: CPT

## 2024-10-08 PROCEDURE — 36415 COLL VENOUS BLD VENIPUNCTURE: CPT

## 2024-10-08 PROCEDURE — 80053 COMPREHEN METABOLIC PANEL: CPT

## 2024-12-06 ENCOUNTER — OFFICE VISIT (OUTPATIENT)
Dept: CARDIOLOGY | Facility: CLINIC | Age: 89
End: 2024-12-06
Payer: MEDICARE

## 2024-12-06 VITALS
HEART RATE: 62 BPM | DIASTOLIC BLOOD PRESSURE: 80 MMHG | OXYGEN SATURATION: 100 % | BODY MASS INDEX: 23.52 KG/M2 | WEIGHT: 137.8 LBS | SYSTOLIC BLOOD PRESSURE: 120 MMHG | HEIGHT: 64 IN

## 2024-12-06 DIAGNOSIS — F17.200 SMOKER: ICD-10-CM

## 2024-12-06 DIAGNOSIS — Z95.5 HISTORY OF CORONARY ANGIOPLASTY WITH INSERTION OF STENT: Primary | ICD-10-CM

## 2024-12-06 DIAGNOSIS — I25.84 CORONARY ARTERY DISEASE DUE TO CALCIFIED CORONARY LESION: ICD-10-CM

## 2024-12-06 DIAGNOSIS — I25.10 CORONARY ARTERY DISEASE DUE TO CALCIFIED CORONARY LESION: ICD-10-CM

## 2024-12-06 DIAGNOSIS — E78.5 DYSLIPIDEMIA: ICD-10-CM

## 2024-12-06 DIAGNOSIS — Z86.718 HISTORY OF DVT (DEEP VEIN THROMBOSIS): ICD-10-CM

## 2024-12-06 DIAGNOSIS — I25.5 ISCHEMIC CARDIOMYOPATHY: ICD-10-CM

## 2024-12-06 DIAGNOSIS — R09.89 LEFT CAROTID BRUIT: ICD-10-CM

## 2024-12-06 DIAGNOSIS — I10 ESSENTIAL HYPERTENSION: ICD-10-CM

## 2024-12-06 DIAGNOSIS — I35.1 NONRHEUMATIC AORTIC VALVE REGURGITATION: ICD-10-CM

## 2024-12-06 RX ORDER — SACUBITRIL AND VALSARTAN 24; 26 MG/1; MG/1
1 TABLET, FILM COATED ORAL 2 TIMES DAILY
Qty: 180 TABLET | Refills: 1 | Status: SHIPPED | OUTPATIENT
Start: 2024-12-06

## 2024-12-06 RX ORDER — METOPROLOL SUCCINATE 25 MG/1
25 TABLET, EXTENDED RELEASE ORAL DAILY
Qty: 90 TABLET | Refills: 1 | Status: SHIPPED | OUTPATIENT
Start: 2024-12-06

## 2024-12-06 RX ORDER — SACUBITRIL AND VALSARTAN 24; 26 MG/1; MG/1
1 TABLET, FILM COATED ORAL 2 TIMES DAILY
Qty: 180 TABLET | Refills: 1 | Status: SHIPPED | OUTPATIENT
Start: 2024-12-06 | End: 2024-12-06 | Stop reason: SDUPTHER

## 2024-12-06 RX ORDER — ROSUVASTATIN CALCIUM 20 MG/1
20 TABLET, COATED ORAL DAILY
Qty: 90 TABLET | Refills: 3 | Status: SHIPPED | OUTPATIENT
Start: 2024-12-06

## 2024-12-06 RX ORDER — METOPROLOL SUCCINATE 25 MG/1
25 TABLET, EXTENDED RELEASE ORAL DAILY
Qty: 90 TABLET | Refills: 1 | Status: SHIPPED | OUTPATIENT
Start: 2024-12-06 | End: 2024-12-06 | Stop reason: SDUPTHER

## 2024-12-06 NOTE — PROGRESS NOTES
Subjective   Follow up, CAD, HTN  Emerita Lew is a 89 y.o. female who presents to day for Follow-up (Cardiac management -6 month visit //Labs- most recent 10/2024//Medication - pended refills for approval ), Coronary Artery Disease (No complaint of chest pain / tightness / palpitations ), and Hypertension (She checks at home only when needed ).    CHIEF COMPLIANT  Chief Complaint   Patient presents with    Follow-up     Cardiac management -6 month visit     Labs- most recent 10/2024    Medication - pended refills for approval     Coronary Artery Disease     No complaint of chest pain / tightness / palpitations     Hypertension     She checks at home only when needed        Active Problems:  1.  Coronary artery disease.  1.1.  ST elevation MI/inferior wall MI with subsequent catheterization, 5/2022.  The patient had occlusion of the ostium of the RCA with subsequent stenting x2 for the same.  The patient had residual disease of approximately 85% in the circumflex.  1.2.  Elective intervention of the circumflex/OM disease, 7/2022.  The patient had stenting times one of the high-grade disease of the OM.  Previously placed stent to the RCA was patent.  The patient had minor disease otherwise.  2.  Ischemic cardiomyopathy.  2.1.  Estimated EF at time of inferior wall MI/ST elevation MI in May, 2022, reported at 40±5%.  2.2.  Ventriculography during catheterization, July, 2022, demonstrating an EF of 55%.  3.  Reported history of abdominal aortic aneurysm, but no aneurysmal formation noted on ultrasound, 7/2022.  4.  Carotid artery stenosis.  4.1.  Nonobstructive disease in bilateral carotid systems, less than 50%, per carotid duplex on 7/2022.  5.  Hypertension  6.  Dyslipidemia.  7.  History of CVA.  8.  History of DVT per report.    HPI  HPI    History of Present Illness  The patient presents for evaluation of multiple medical concerns.    He reports overall good health and does not monitor his blood pressure at  home. He is not experiencing any chest pain, shortness of breath, or swelling. He also reports no irregular heart rhythms such as palpitations or skipped beats. He is not currently on any cholesterol medication. He was previously informed of an aneurysm in his heart, but this has not been recently evaluated.    He has not taken the Chantix prescribed during his last visit.    He has been to the ear, nose, and throat specialist for his sinuses. He had a biopsy done which was negative for cancer.    SOCIAL HISTORY  He is still smoking.       PRIOR MEDS  Current Outpatient Medications on File Prior to Visit   Medication Sig Dispense Refill    aspirin 81 MG EC tablet Take 1 tablet by mouth Daily. 90 tablet 1    ipratropium (ATROVENT) 0.06 % nasal spray SPRAY 2 SPRAYS 4 TIMES A DAY BY INTRANASAL ROUTE      [DISCONTINUED] Entresto 24-26 MG tablet Take 1 tablet by mouth 2 (Two) Times a Day. 180 tablet 1    [DISCONTINUED] metoprolol succinate XL (TOPROL-XL) 25 MG 24 hr tablet Take 1 tablet by mouth Daily. 90 tablet 1    [DISCONTINUED] doxycycline (MONODOX) 100 MG capsule Take 1 capsule by mouth 2 (Two) Times a Day. (Patient not taking: Reported on 9/10/2024) 20 capsule 0     No current facility-administered medications on file prior to visit.       ALLERGIES  Atorvastatin and Percocet [oxycodone-acetaminophen]    HISTORY  Past Medical History:   Diagnosis Date    Aneurysm     aortic    Arthritis     COPD (chronic obstructive pulmonary disease)     Coronary artery disease     DVT (deep venous thrombosis)     Hyperlipidemia     Hypertension     Ischemic cardiomyopathy     Myocardial infarction     stemi [facility] 5-       Social History     Socioeconomic History    Marital status:    Tobacco Use    Smoking status: Every Day    Smokeless tobacco: Never    Tobacco comments:     smoked a 3/4 PPD since age 18   Vaping Use    Vaping status: Never Used   Substance and Sexual Activity    Alcohol use: No    Drug use: No     "Sexual activity: Defer       Family History   Problem Relation Age of Onset    Stroke Mother     Lung cancer Father        Review of Systems   Constitutional:  Positive for chills and fatigue. Negative for fever.   HENT:  Negative for congestion, rhinorrhea and sore throat.    Respiratory: Negative.  Negative for chest tightness, shortness of breath and wheezing.    Cardiovascular: Negative.  Negative for chest pain, palpitations and leg swelling.   Gastrointestinal: Negative.    Endocrine: Positive for cold intolerance.   Musculoskeletal: Negative.  Positive for back pain. Negative for arthralgias and neck pain.   Skin: Negative.    Allergic/Immunologic: Positive for environmental allergies.   Neurological: Negative.  Negative for dizziness, weakness, numbness and headaches.   Hematological:  Bruises/bleeds easily.   Psychiatric/Behavioral:  Negative for sleep disturbance.        Objective     VITALS: /80 (BP Location: Left arm, Patient Position: Sitting, Cuff Size: Adult)   Pulse 62   Ht 162.6 cm (64.02\")   Wt 62.5 kg (137 lb 12.8 oz)   SpO2 100%   BMI 23.64 kg/m²     LABS:   Lab Results (most recent)       None            IMAGING:   CT Soft Tissue Neck With Contrast    Result Date: 8/20/2024  Impression: Presumed finding of interest is seen as an area of skin thickening with adjacent 14 x 11 mm homogeneous fluid density cyst, possible sebaceous cyst with superinfection/abscess difficult to exclude on imaging. There is no significant surrounding inflammatory change or pathologic cervical adenopathy. 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 These remain a finding of uncertain clinical significance in a patient of this age. Consider otolaryngology consultation as indicated. Electronically Signed: Jensen Angel MD  8/20/2024 1:38 PM EDT  Workstation ID: [id number]      EXAM:  Physical Exam  Vitals reviewed. "   Constitutional:       Appearance: She is well-developed.   HENT:      Head: Normocephalic and atraumatic.   Eyes:      Pupils: Pupils are equal, round, and reactive to light.   Neck:      Thyroid: No thyromegaly.      Vascular: Carotid bruit present. No JVD.      Comments: Left carotid bruit  Cardiovascular:      Rate and Rhythm: Normal rate and regular rhythm.      Heart sounds: Normal heart sounds. No murmur heard.     No friction rub. No gallop.   Pulmonary:      Effort: Pulmonary effort is normal. No respiratory distress.      Breath sounds: No stridor. Wheezing present. No rales.   Chest:      Chest wall: No tenderness.   Musculoskeletal:         General: No tenderness or deformity.      Cervical back: Neck supple.   Skin:     General: Skin is warm and dry.   Neurological:      Mental Status: She is alert and oriented to person, place, and time.      Cranial Nerves: No cranial nerve deficit.      Coordination: Coordination normal.       Physical Exam  Mild wheezing noted in lungs.       Procedure   Procedures      Results  Laboratory Studies  Bad cholesterol was 154, good cholesterol was 53.    Imaging  Ultrasound in 2022 showed no aneurysm.       Assessment & Plan     Diagnoses and all orders for this visit:    1. History of coronary angioplasty with insertion of stent (Primary)  -     US AAA Screen Limited; Future    2. Ischemic cardiomyopathy  -     Discontinue: metoprolol succinate XL (TOPROL-XL) 25 MG 24 hr tablet; Take 1 tablet by mouth Daily.  Dispense: 90 tablet; Refill: 1  -     Discontinue: Entresto 24-26 MG tablet; Take 1 tablet by mouth 2 (Two) Times a Day.  Dispense: 180 tablet; Refill: 1  -     Adult Transthoracic Echo Complete w/ Color, Spectral and Contrast if necessary per protocol; Future  -     Entresto 24-26 MG tablet; Take 1 tablet by mouth 2 (Two) Times a Day.  Dispense: 180 tablet; Refill: 1  -     metoprolol succinate XL (TOPROL-XL) 25 MG 24 hr tablet; Take 1 tablet by mouth Daily.   Dispense: 90 tablet; Refill: 1  -     US AAA Screen Limited; Future    3. Essential hypertension    4. Coronary artery disease due to calcified coronary lesion    5. Dyslipidemia  -     rosuvastatin (CRESTOR) 20 MG tablet; Take 1 tablet by mouth Daily.  Dispense: 90 tablet; Refill: 3  -     Lipid Panel; Future  -     Comprehensive Metabolic Panel; Future    6. Left carotid bruit    7. Nonrheumatic aortic valve regurgitation    8. History of DVT (deep vein thrombosis)    9. Smoker      Assessment & Plan  1. Hypertension.  His blood pressure readings are within the normal range. The current antihypertensive regimen will be maintained. He is advised to continue taking aspirin.    2. Hyperlipidemia.  His LDL cholesterol level is elevated at 154, while his HDL cholesterol level is satisfactory at 53. A prescription for rosuvastatin will be sent to his pharmacy[facility]. A lipid profile will be conducted prior to his next visit.    3. Coronary artery disease.  His previous echocardiogram showed improvement in cardiac function, albeit with a small pericardial effusion. An echocardiogram will be repeated to assess the status of the pericardial effusion. His current cardiac medications will be continued.    4. Aneurysm.  His abdominal ultrasound from 2022 did not reveal any aneurysm. An abdominal ultrasound will be repeated to confirm the absence of an aneurysm.    5. Smoking.  He continues to smoke and has declined assistance with quitting, such as nicotine patches or Chantix.    Follow-up  Return in 6 months for follow up.       Return in about 6 months (around 6/6/2025).      Advance Care Planning   ACP discussion was held with the patient during this visit. Patient has an advance directive in EMR which is still valid.              MEDS ORDERED DURING VISIT:  New Medications Ordered This Visit   Medications    Entresto 24-26 MG tablet     Sig: Take 1 tablet by mouth 2 (Two) Times a Day.     Dispense:  180 tablet      Refill:  1    metoprolol succinate XL (TOPROL-XL) 25 MG 24 hr tablet     Sig: Take 1 tablet by mouth Daily.     Dispense:  90 tablet     Refill:  1     This prescription was filled on 9/28/2022. Any refills authorized will be placed on file.    rosuvastatin (CRESTOR) 20 MG tablet     Sig: Take 1 tablet by mouth Daily.     Dispense:  90 tablet     Refill:  3         As always, Parul Painter APRN  I appreciate very much the opportunity to participate in the cardiovascular care of your patients. Please do not hesitate to call me with any questions with regards to Emeritakanika Lew evaluation and management.     Patient or patient representative verbalized consent for the use of Ambient Listening during the visit with  Brandi Barboza MD for chart documentation. 12/6/2024  11:35 EST       This document has been electronically signed by Brandi Barboza MD  December 6, 2024 12:15 EST    This note is dictated utilizing voice recognition software.

## 2024-12-30 ENCOUNTER — HOSPITAL ENCOUNTER (OUTPATIENT)
Dept: CARDIOLOGY | Facility: HOSPITAL | Age: 89
Discharge: HOME | End: 2024-12-30
Payer: MEDICARE

## 2024-12-30 DIAGNOSIS — Z95.5 HISTORY OF CORONARY ANGIOPLASTY WITH INSERTION OF STENT: ICD-10-CM

## 2024-12-30 DIAGNOSIS — I25.5 ISCHEMIC CARDIOMYOPATHY: ICD-10-CM

## 2024-12-30 PROCEDURE — 76706 US ABDL AORTA SCREEN AAA: CPT

## 2024-12-30 PROCEDURE — 93306 TTE W/DOPPLER COMPLETE: CPT

## 2025-01-05 LAB
AV MEAN PRESS GRAD SYS DOP V1V2: 4.1 MMHG
AV VMAX SYS DOP: 133 CM/SEC
BH CV ECHO MEAS - ACS: 1.84 CM
BH CV ECHO MEAS - AI P1/2T: 500.1 MSEC
BH CV ECHO MEAS - AO MAX PG: 7.1 MMHG
BH CV ECHO MEAS - AO ROOT DIAM: 3 CM
BH CV ECHO MEAS - AO V2 VTI: 30.9 CM
BH CV ECHO MEAS - EDV(CUBED): 92.3 ML
BH CV ECHO MEAS - EDV(MOD-SP4): 92.7 ML
BH CV ECHO MEAS - EF(MOD-SP4): 56.9 %
BH CV ECHO MEAS - ESV(CUBED): 15.6 ML
BH CV ECHO MEAS - ESV(MOD-SP4): 40 ML
BH CV ECHO MEAS - FS: 44.7 %
BH CV ECHO MEAS - IVS/LVPW: 0.96 CM
BH CV ECHO MEAS - IVSD: 1.11 CM
BH CV ECHO MEAS - LA DIMENSION: 3.6 CM
BH CV ECHO MEAS - LAT PEAK E' VEL: 5.8 CM/SEC
BH CV ECHO MEAS - LV DIASTOLIC VOL/BSA (35-75): 55.7 CM2
BH CV ECHO MEAS - LV MASS(C)D: 184.2 GRAMS
BH CV ECHO MEAS - LV SYSTOLIC VOL/BSA (12-30): 24 CM2
BH CV ECHO MEAS - LVIDD: 4.5 CM
BH CV ECHO MEAS - LVIDS: 2.5 CM
BH CV ECHO MEAS - LVPWD: 1.16 CM
BH CV ECHO MEAS - MED PEAK E' VEL: 5.4 CM/SEC
BH CV ECHO MEAS - MV A MAX VEL: 99.4 CM/SEC
BH CV ECHO MEAS - MV DEC TIME: 0.25 SEC
BH CV ECHO MEAS - MV E MAX VEL: 69.8 CM/SEC
BH CV ECHO MEAS - MV E/A: 0.7
BH CV ECHO MEAS - RAP SYSTOLE: 10 MMHG
BH CV ECHO MEAS - RVDD: 2.6 CM
BH CV ECHO MEAS - RVSP: 27.9 MMHG
BH CV ECHO MEAS - SV(MOD-SP4): 52.7 ML
BH CV ECHO MEAS - SVI(MOD-SP4): 31.6 ML/M2
BH CV ECHO MEAS - TR MAX PG: 17.9 MMHG
BH CV ECHO MEAS - TR MAX VEL: 211.7 CM/SEC
BH CV ECHO MEASUREMENTS AVERAGE E/E' RATIO: 12.46
LEFT ATRIUM VOLUME INDEX: 25.3 ML/M2
LV EF 3D SEGMENTATION: 55 %

## 2025-01-06 ENCOUNTER — TELEPHONE (OUTPATIENT)
Dept: CARDIOLOGY | Facility: CLINIC | Age: 89
End: 2025-01-06
Payer: MEDICARE

## 2025-01-06 NOTE — TELEPHONE ENCOUNTER
----- Message from Elis CRUZ sent at 1/6/2025  2:32 PM EST -----  US AAA SCREEN LIMITED-   ----- Message -----  From: Brandi Barboza MD  Sent: 1/6/2025  12:29 PM EST  To: [facility]    Please call patient normal result

## 2025-05-27 ENCOUNTER — LAB (OUTPATIENT)
Dept: LAB | Facility: HOSPITAL | Age: 89
End: 2025-05-27
Payer: MEDICARE

## 2025-05-27 DIAGNOSIS — E78.5 DYSLIPIDEMIA: ICD-10-CM

## 2025-05-27 LAB
ALBUMIN SERPL-MCNC: 4.1 G/DL (ref 3.5–5.2)
ALBUMIN/GLOB SERPL: 1.2 G/DL
ALP SERPL-CCNC: 89 U/L (ref 39–117)
ALT SERPL W P-5'-P-CCNC: 8 U/L (ref 1–33)
ANION GAP SERPL CALCULATED.3IONS-SCNC: 8.1 MMOL/L (ref 5–15)
AST SERPL-CCNC: 19 U/L (ref 1–32)
BILIRUB SERPL-MCNC: 0.4 MG/DL (ref 0–1.2)
BUN SERPL-MCNC: 15.6 MG/DL (ref 8–23)
BUN/CREAT SERPL: 13.2 (ref 7–25)
CALCIUM SPEC-SCNC: 9.4 MG/DL (ref 8.6–10.5)
CHLORIDE SERPL-SCNC: 106 MMOL/L (ref 98–107)
CHOLEST SERPL-MCNC: 125 MG/DL (ref 0–200)
CO2 SERPL-SCNC: 25.9 MMOL/L (ref 22–29)
CREAT SERPL-MCNC: 1.18 MG/DL (ref 0.57–1)
EGFRCR SERPLBLD CKD-EPI 2021: 44.8 ML/MIN/1.73
GLOBULIN UR ELPH-MCNC: 3.4 GM/DL
GLUCOSE SERPL-MCNC: 98 MG/DL (ref 65–99)
HDLC SERPL-MCNC: 47 MG/DL (ref 40–60)
LDLC SERPL CALC-MCNC: 58 MG/DL (ref 0–100)
LDLC/HDLC SERPL: 1.19 {RATIO}
POTASSIUM SERPL-SCNC: 4.6 MMOL/L (ref 3.5–5.2)
PROT SERPL-MCNC: 7.5 G/DL (ref 6–8.5)
SODIUM SERPL-SCNC: 140 MMOL/L (ref 136–145)
TRIGL SERPL-MCNC: 111 MG/DL (ref 0–150)
VLDLC SERPL-MCNC: 20 MG/DL (ref 5–40)

## 2025-05-27 PROCEDURE — 80053 COMPREHEN METABOLIC PANEL: CPT

## 2025-05-27 PROCEDURE — 36415 COLL VENOUS BLD VENIPUNCTURE: CPT

## 2025-05-27 PROCEDURE — 80061 LIPID PANEL: CPT

## 2025-06-06 ENCOUNTER — OFFICE VISIT (OUTPATIENT)
Dept: CARDIOLOGY | Facility: CLINIC | Age: 89
End: 2025-06-06
Payer: MEDICARE

## 2025-06-06 VITALS
DIASTOLIC BLOOD PRESSURE: 67 MMHG | SYSTOLIC BLOOD PRESSURE: 127 MMHG | OXYGEN SATURATION: 96 % | HEIGHT: 64 IN | HEART RATE: 59 BPM | BODY MASS INDEX: 23.93 KG/M2 | WEIGHT: 140.2 LBS

## 2025-06-06 DIAGNOSIS — I25.5 ISCHEMIC CARDIOMYOPATHY: ICD-10-CM

## 2025-06-06 DIAGNOSIS — I25.10 CORONARY ARTERY DISEASE DUE TO CALCIFIED CORONARY LESION: ICD-10-CM

## 2025-06-06 DIAGNOSIS — I31.39 PERICARDIAL EFFUSION: ICD-10-CM

## 2025-06-06 DIAGNOSIS — I10 ESSENTIAL HYPERTENSION: Primary | ICD-10-CM

## 2025-06-06 DIAGNOSIS — I25.84 CORONARY ARTERY DISEASE DUE TO CALCIFIED CORONARY LESION: ICD-10-CM

## 2025-06-06 PROCEDURE — 93000 ELECTROCARDIOGRAM COMPLETE: CPT | Performed by: NURSE PRACTITIONER

## 2025-06-06 PROCEDURE — 1160F RVW MEDS BY RX/DR IN RCRD: CPT | Performed by: NURSE PRACTITIONER

## 2025-06-06 PROCEDURE — 99214 OFFICE O/P EST MOD 30 MIN: CPT | Performed by: NURSE PRACTITIONER

## 2025-06-06 PROCEDURE — 1159F MED LIST DOCD IN RCRD: CPT | Performed by: NURSE PRACTITIONER

## 2025-06-06 RX ORDER — SACUBITRIL AND VALSARTAN 24; 26 MG/1; MG/1
1 TABLET, FILM COATED ORAL 2 TIMES DAILY
Qty: 180 TABLET | Refills: 2 | Status: SHIPPED | OUTPATIENT
Start: 2025-06-06

## 2025-06-06 RX ORDER — METOPROLOL SUCCINATE 25 MG/1
25 TABLET, EXTENDED RELEASE ORAL DAILY
Qty: 90 TABLET | Refills: 2 | Status: SHIPPED | OUTPATIENT
Start: 2025-06-06

## 2025-06-06 NOTE — PROGRESS NOTES
Subjective     Emerita Lew is a 89 y.o. female who presents to day for Follow-up, Hypertension, and Coronary Artery Disease.    CHIEF COMPLIANT  Chief Complaint   Patient presents with    Follow-up    Hypertension    Coronary Artery Disease       Active Problems:  Problem List Items Addressed This Visit          Cardiac and Vasculature    Essential hypertension - Primary    Relevant Medications    metoprolol succinate XL (TOPROL-XL) 25 MG 24 hr tablet    Coronary artery disease due to calcified coronary lesion    Relevant Medications    metoprolol succinate XL (TOPROL-XL) 25 MG 24 hr tablet    Entresto 24-26 MG tablet    Ischemic cardiomyopathy    Relevant Medications    metoprolol succinate XL (TOPROL-XL) 25 MG 24 hr tablet    Entresto 24-26 MG tablet     Other Visit Diagnoses         Pericardial effusion        Relevant Orders    Adult Transthoracic Echo Limited W/ Cont if Necessary Per Protocol        1.  Coronary artery disease.  1.1.  ST elevation MI/inferior wall MI with subsequent catheterization, 5/2022.  The patient had occlusion of the ostium of the RCA with subsequent stenting x2 for the same.  The patient had residual disease of approximately 85% in the circumflex.  1.2.  Elective intervention of the circumflex/OM disease, 7/2022.  The patient had stenting times one of the high-grade disease of the OM.  Previously placed stent to the RCA was patent.  The patient had minor disease otherwise.  2.  Ischemic cardiomyopathy.  2.1.  Estimated EF at time of inferior wall MI/ST elevation MI in May, 2022, reported at 40±5%.  2.2.  Ventriculography during catheterization, July, 2022, demonstrating an EF of 55%.  3.  Reported history of abdominal aortic aneurysm, but no aneurysmal formation noted on ultrasound, 7/2022.  4.  Carotid artery stenosis.  4.1.  Nonobstructive disease in bilateral carotid systems, less than 50%, per carotid duplex on 7/2022.  5.  Hypertension  6.  Dyslipidemia.  7.  History of CVA.  8.   History of DVT per report.       HPI  HPI  Ms. Juli Ronquillo is an 89-year-old female patient who is being seen today for follow-up.    Patient does have a history of coronary artery disease in which she is went under stenting of the RCA and circumflex in 2022.  She is on aspirin for antiplatelet therapy and rosuvastatin for high intensity statin therapy.    Patient also has a history of chronic arterial hypertension in which she is on Entresto and metoprolol succinate.  Today her blood pressure is 127/67 heart rate of 59    Patient does have a history of heart failure with reduced ejection fraction which does appear to be recovered based on her most recent echocardiogram that showed ejection fraction of 66 to 70%, grade 1 diastolic dysfunction.  She is on Entresto and metoprolol succinate for guideline treatment of her heart failure    Patient did go under echocardiogram and December 2024 that showed a 1 to 2 cm circumferential pericardial effusion with no evidence of cardiac tamponade.ejection fraction of 66 to 70%, grade 1 diastolic dysfunction.  We did discuss this in detail.  Patient denies any shortness of breath or orthopnea.    Most recently patient did have a CMP that showed an elevated creatinine 1.18 with an EGFR of 44.8.  This is similar to previous.  Patient also had a lipid panel that showed a triglycerides 111, HDL 47, and LDL 58.  This is much improved from previous.    Patient does have dizziness that occurs very rarely.  Says it was occurring quite frequently and her blood pressure medications were adjusted and only occurs with on a intermittent basis now and is usually very short-lived.    Patient does have an occasional lower extremity edema but does not occur on a daily basis.  Says it does not occur very much at all.  Usually mild around the feet and ankles.    Patient does report fatigue where she gets tired easily.    Overall patient reports a good functional capacity with only being limited  by her right knee issues.  Patient denies any chest pain, shortness of breath, palpitations,  syncope, PND, orthopnea, or strokelike symptoms.  PRIOR MEDS  Current Outpatient Medications on File Prior to Visit   Medication Sig Dispense Refill    aspirin 81 MG EC tablet Take 1 tablet by mouth Daily. 90 tablet 1    rosuvastatin (CRESTOR) 20 MG tablet Take 1 tablet by mouth Daily. 90 tablet 3    [DISCONTINUED] Entresto 24-26 MG tablet Take 1 tablet by mouth 2 (Two) Times a Day. 180 tablet 1    [DISCONTINUED] metoprolol succinate XL (TOPROL-XL) 25 MG 24 hr tablet Take 1 tablet by mouth Daily. 90 tablet 1    [DISCONTINUED] ipratropium (ATROVENT) 0.06 % nasal spray SPRAY 2 SPRAYS 4 TIMES A DAY BY INTRANASAL ROUTE       No current facility-administered medications on file prior to visit.       ALLERGIES  Atorvastatin and Percocet [oxycodone-acetaminophen]    HISTORY  Past Medical History:   Diagnosis Date    Aneurysm     aortic    Arthritis     COPD (chronic obstructive pulmonary disease)     Coronary artery disease     DVT (deep venous thrombosis)     Hyperlipidemia     Hypertension     Ischemic cardiomyopathy     Myocardial infarction     stemi [facility] 5-       Social History     Socioeconomic History    Marital status:    Tobacco Use    Smoking status: Every Day    Smokeless tobacco: Never    Tobacco comments:     smoked a 3/4 PPD since age 18   Vaping Use    Vaping status: Never Used   Substance and Sexual Activity    Alcohol use: No    Drug use: No    Sexual activity: Defer       Family History   Problem Relation Age of Onset    Stroke Mother     Lung cancer Father        Review of Systems   Constitutional:  Positive for fatigue. Negative for chills and fever.   HENT:  Negative for congestion, rhinorrhea and sore throat.    Eyes:  Negative for visual disturbance.   Respiratory:  Negative for apnea, chest tightness and shortness of breath.    Cardiovascular:  Positive for leg swelling (mild  feet and  "ankles). Negative for chest pain and palpitations.   Gastrointestinal:  Positive for constipation (takes Milk of Mag). Negative for diarrhea and nausea.   Musculoskeletal:  Positive for arthralgias (knees). Negative for back pain and neck pain.   Skin:  Negative for rash and wound.   Allergic/Immunologic: Positive for environmental allergies. Negative for food allergies.   Neurological:  Positive for dizziness (is very rare is random). Negative for syncope, weakness and light-headedness.   Hematological:  Bruises/bleeds easily (bruise).   Psychiatric/Behavioral:  Negative for sleep disturbance (NO SOB does not sleep well).        Objective     VITALS: /67 (BP Location: Left arm, Patient Position: Sitting, Cuff Size: Adult)   Pulse 59   Ht 162.6 cm (64.02\")   Wt 63.6 kg (140 lb 3.2 oz)   SpO2 96%   BMI 24.05 kg/m²     LABS:   Lab Results (most recent)       None            IMAGING:   No Images in the past 120 days found..    EXAM:  Physical Exam  Vitals and nursing note reviewed.   Constitutional:       Appearance: She is well-developed.   HENT:      Head: Normocephalic.   Neck:      Thyroid: No thyroid mass.      Vascular: Carotid bruit (left) present. No JVD.      Trachea: Trachea and phonation normal.   Cardiovascular:      Rate and Rhythm: Normal rate and regular rhythm.      Pulses:           Radial pulses are 2+ on the right side and 2+ on the left side.        Posterior tibial pulses are 2+ on the right side and 2+ on the left side.      Heart sounds: Murmur heard.      No friction rub. No gallop.   Pulmonary:      Effort: Pulmonary effort is normal. No respiratory distress.      Breath sounds: Normal breath sounds. No wheezing or rales.   Musculoskeletal:         General: No swelling. Normal range of motion.      Cervical back: Neck supple.   Skin:     General: Skin is warm and dry.      Capillary Refill: Capillary refill takes less than 2 seconds.      Findings: No rash.   Neurological:      " Mental Status: She is alert and oriented to person, place, and time.   Psychiatric:         Speech: Speech normal.         Behavior: Behavior normal.         Thought Content: Thought content normal.         Judgment: Judgment normal.         Procedure     ECG 12 Lead    Date/Time: 6/6/2025 10:22 AM  Performed by: Carmelo Tillman APRN    Authorized by: Carmelo Tillman APRN  Comparison: compared with previous ECG from 3/15/2023  Rhythm: sinus bradycardia  Rate: bradycardic  BPM: 58  QRS axis: right  Other findings: non-specific ST-T wave changes and T wave abnormality  Comments: C4 04 ms  No acute changes             Assessment & Plan    Diagnosis Plan   1. Essential hypertension        2. Coronary artery disease due to calcified coronary lesion        3. Ischemic cardiomyopathy  metoprolol succinate XL (TOPROL-XL) 25 MG 24 hr tablet    Entresto 24-26 MG tablet      4. Pericardial effusion  Adult Transthoracic Echo Limited W/ Cont if Necessary Per Protocol      1.  Patient's blood pressure is controlled on current blood pressure medication regimen.  No medication changes are warranted at this time.  Patient advised to monitor blood pressure on a daily basis and report any persistent highs or lows.  Set goal blood pressure for patient at 130/80 or below.  2.  Patient's coronary artery disease seems to be stable.  She denies any significant angina or anginal equivalent symptoms.  Will continue to follow.  3.  Patient does have hyperlipidemia which her most recent lipid panel shows that her cholesterol is within goal.  She will continue her current rosuvastatin dosing.  4.  Patient does have a history of covert heart failure with reduced ejection fraction, she is on Entresto and metoprolol.  She is tolerating these well.  Will continue without change.  5.  Due to patient's previous pericardial effusion mild to moderate without tamponade physiology I would like to repeat an echocardiogram to look for resolution.  6.   Informed of signs and symptoms of ACS and advised to seek emergent treatment for any new worsening symptoms.  Patient also advised sooner follow-up as needed.  Also advised to follow-up with family doctor as needed  This note is dictated utilizing voice recognition software.  Although this record has been proof read, transcriptional errors may still be present. If questions occur regarding the content of this record please do not hesitate to call our office.  I have reviewed and confirmed the accuracy of the ROS as documented by the MA/LPN/RN MYRON Bowen    No follow-ups on file.    Diagnoses and all orders for this visit:    1. Essential hypertension (Primary)    2. Coronary artery disease due to calcified coronary lesion    3. Ischemic cardiomyopathy  -     metoprolol succinate XL (TOPROL-XL) 25 MG 24 hr tablet; Take 1 tablet by mouth Daily.  Dispense: 90 tablet; Refill: 2  -     Entresto 24-26 MG tablet; Take 1 tablet by mouth 2 (Two) Times a Day.  Dispense: 180 tablet; Refill: 2    4. Pericardial effusion  -     Adult Transthoracic Echo Limited W/ Cont if Necessary Per Protocol; Future    Other orders  -     ECG 12 Lead        Emerita Lew  reports that she has been smoking. She has never used smokeless tobacco. I have educated her on the risk of diseases from using tobacco products such as cancer, COPD, and heart disease.     I advised her to quit and she is not willing to quit.    I spent 5.5 minutes counseling the patient.         Advance Care Planning   ACP discussion was held with the patient during this visit. Patient has an advance directive (not in EMR), copy requested.        BMI is within normal parameters. No other follow-up for BMI required.           MEDS ORDERED DURING VISIT:  New Medications Ordered This Visit   Medications    metoprolol succinate XL (TOPROL-XL) 25 MG 24 hr tablet     Sig: Take 1 tablet by mouth Daily.     Dispense:  90 tablet     Refill:  2    Entresto 24-26 MG tablet      Sig: Take 1 tablet by mouth 2 (Two) Times a Day.     Dispense:  180 tablet     Refill:  2           This document has been electronically signed by Carmelo Tillman Jr., APRN  June 6, 2025 10:46 EDT

## 2025-07-07 ENCOUNTER — HOSPITAL ENCOUNTER (OUTPATIENT)
Dept: CARDIOLOGY | Facility: HOSPITAL | Age: 89
Discharge: HOME | End: 2025-07-07
Admitting: NURSE PRACTITIONER
Payer: MEDICARE

## 2025-07-07 VITALS — BODY MASS INDEX: 23.94 KG/M2 | HEIGHT: 64 IN | WEIGHT: 140.21 LBS

## 2025-07-07 DIAGNOSIS — I31.39 PERICARDIAL EFFUSION: ICD-10-CM

## 2025-07-07 PROCEDURE — 93308 TTE F-UP OR LMTD: CPT

## 2025-07-07 PROCEDURE — 93325 DOPPLER ECHO COLOR FLOW MAPG: CPT

## 2025-07-07 PROCEDURE — 93321 DOPPLER ECHO F-UP/LMTD STD: CPT

## 2025-07-08 LAB
BH CV ECHO MEAS - ACS: 1.27 CM
BH CV ECHO MEAS - AO ROOT DIAM: 3.1 CM
BH CV ECHO MEAS - EDV(CUBED): 93.7 ML
BH CV ECHO MEAS - ESV(CUBED): 31.2 ML
BH CV ECHO MEAS - FS: 30.7 %
BH CV ECHO MEAS - IVS/LVPW: 1.33 CM
BH CV ECHO MEAS - IVSD: 1.22 CM
BH CV ECHO MEAS - LA DIMENSION: 3.4 CM
BH CV ECHO MEAS - LV MASS(C)D: 170.3 GRAMS
BH CV ECHO MEAS - LVIDD: 4.5 CM
BH CV ECHO MEAS - LVIDS: 3.1 CM
BH CV ECHO MEAS - LVPWD: 0.92 CM
LV EF 2D ECHO EST: 58 %
SINUS: 2.9 CM

## 2025-07-09 ENCOUNTER — RESULTS FOLLOW-UP (OUTPATIENT)
Dept: CARDIOLOGY | Facility: CLINIC | Age: 89
End: 2025-07-09
Payer: MEDICARE

## 2025-07-09 NOTE — TELEPHONE ENCOUNTER
Relay  I called patient but could not leave a message . The Echo results are as follows:     Carmelo Tillman APRN to Me (Selected Message)      7/9/25  8:42 AM  Result Note  Patient does have a small to moderate pericardial effusion predominantly seen anteriorly.  No tamponade physiology.  Can we bring patient back in the next 2 weeks.  In this meantime would also like for her to have a CT of her chest.  Adult Transthoracic Echo Limited W/ Cont if Necessary Per Protocol    Pericardial Effusion is a buildup of fluid in the sac like pericardium / membrane that surrounds the heart.

## 2025-07-22 ENCOUNTER — OFFICE VISIT (OUTPATIENT)
Dept: CARDIOLOGY | Facility: CLINIC | Age: 89
End: 2025-07-22
Payer: MEDICARE

## 2025-07-22 VITALS
BODY MASS INDEX: 23.56 KG/M2 | OXYGEN SATURATION: 100 % | HEIGHT: 64 IN | WEIGHT: 138 LBS | SYSTOLIC BLOOD PRESSURE: 139 MMHG | DIASTOLIC BLOOD PRESSURE: 64 MMHG | HEART RATE: 62 BPM

## 2025-07-22 DIAGNOSIS — I31.39 PERICARDIAL EFFUSION: Primary | ICD-10-CM

## 2025-07-22 DIAGNOSIS — I10 ESSENTIAL HYPERTENSION: ICD-10-CM

## 2025-07-22 PROCEDURE — 1160F RVW MEDS BY RX/DR IN RCRD: CPT | Performed by: NURSE PRACTITIONER

## 2025-07-22 PROCEDURE — 99214 OFFICE O/P EST MOD 30 MIN: CPT | Performed by: NURSE PRACTITIONER

## 2025-07-22 PROCEDURE — 1159F MED LIST DOCD IN RCRD: CPT | Performed by: NURSE PRACTITIONER

## 2025-07-22 RX ORDER — FUROSEMIDE 20 MG/1
20 TABLET ORAL DAILY
Qty: 14 TABLET | Refills: 0 | Status: SHIPPED | OUTPATIENT
Start: 2025-07-22

## 2025-07-22 RX ORDER — POTASSIUM CHLORIDE 750 MG/1
10 TABLET, EXTENDED RELEASE ORAL DAILY
Qty: 14 TABLET | Refills: 0 | Status: SHIPPED | OUTPATIENT
Start: 2025-07-22

## 2025-07-22 NOTE — PROGRESS NOTES
Subjective     Emerita Lew is a 89 y.o. female who presents to day for Echo follow up and Hypertension.    CHIEF COMPLIANT  Chief Complaint   Patient presents with    Echo follow up    Hypertension       Active Problems:  Problem List Items Addressed This Visit          Cardiac and Vasculature    Essential hypertension    Relevant Medications    furosemide (LASIX) 20 MG tablet    Other Relevant Orders    CT Chest Without Contrast     Other Visit Diagnoses         Pericardial effusion    -  Primary    Relevant Medications    potassium chloride 10 MEQ CR tablet    furosemide (LASIX) 20 MG tablet    Other Relevant Orders    CT Chest Without Contrast        1.  Coronary artery disease.  1.1.  ST elevation MI/inferior wall MI with subsequent catheterization, 5/2022.  The patient had occlusion of the ostium of the RCA with subsequent stenting x2 for the same.  The patient had residual disease of approximately 85% in the circumflex.  1.2.  Elective intervention of the circumflex/OM disease, 7/2022.  The patient had stenting times one of the high-grade disease of the OM.  Previously placed stent to the RCA was patent.  The patient had minor disease otherwise.  2.  Ischemic cardiomyopathy.  2.1.  Estimated EF at time of inferior wall MI/ST elevation MI in May, 2022, reported at 40±5%.  2.2.  Ventriculography during catheterization, July, 2022, demonstrating an EF of 55%.  3.  Reported history of abdominal aortic aneurysm, but no aneurysmal formation noted on ultrasound, 7/2022.  4.  Carotid artery stenosis.  4.1.  Nonobstructive disease in bilateral carotid systems, less than 50%, per carotid duplex on 7/2022.  5.  Hypertension  6.  Dyslipidemia.  7.  History of CVA.  8.  History of DVT per report.    HPI  HPI  Ms. Juli Ronquillo is an 89-year-old female patient who is being seen today for follow-up.     Patient does have a history of coronary artery disease in which she is went under stenting of the RCA and circumflex in  2022.  She is on aspirin for antiplatelet therapy and rosuvastatin for high intensity statin therapy.     Patient also has a history of chronic arterial hypertension in which she is on Entresto and metoprolol succinate.  Today her blood pressure is 139/64 heart rate of 62.    Patient does have a history of heart failure with reduced ejection fraction which does appear to be recovered based on her most recent echocardiogram that showed normal chamber size and function.  Mild concentric left ventricular hypertrophy, no hemodynamically significant valvular disease.  However there was a small to moderate pericardial effusion predominantly seen anteriorly that extended to the inferior and inferior lateral walls.  There is no evidence of tamponade physiology.  She is on Entresto and metoprolol succinate for guideline treatment of her heart failure.  We did discuss her echocardiogram findings in detail including the mild to moderate pericardial effusion.    Patient does report fatigue where she gets tired easier.  She is not able to do the heavy work which her back is a limiting factor.  However she is able to perform activities daily living with minimal issues.    We did review patient's most recent labs that showed a lipid panel indicating triglycerides 111, HDL 47, LDL 58.  Her creatinine was 1.18 with an EGFR of 49.8 which is consistent with what it ran in the last 3 years.  Otherwise the CMP was unremarkable.  We did review this in the office today.    Patient denies any chest pain, shortness of breath, palpitations, lower extremity edema, syncope, PND, orthopnea, or strokelike symptoms.          Current Outpatient Medications on File Prior to Visit   Medication Sig Dispense Refill    aspirin 81 MG EC tablet Take 1 tablet by mouth Daily. 90 tablet 1    Entresto 24-26 MG tablet Take 1 tablet by mouth 2 (Two) Times a Day. 180 tablet 2    metoprolol succinate XL (TOPROL-XL) 25 MG 24 hr tablet Take 1 tablet by mouth  "Daily. 90 tablet 2    rosuvastatin (CRESTOR) 20 MG tablet Take 1 tablet by mouth Daily. 90 tablet 3     No current facility-administered medications on file prior to visit.       ALLERGIES  Atorvastatin and Percocet [oxycodone-acetaminophen]    HISTORY  Past Medical History:   Diagnosis Date    Aneurysm     aortic    Arthritis     COPD (chronic obstructive pulmonary disease)     Coronary artery disease     DVT (deep venous thrombosis)     Hyperlipidemia     Hypertension     Ischemic cardiomyopathy     Myocardial infarction     stemi [facility] 5-       Social History     Socioeconomic History    Marital status:    Tobacco Use    Smoking status: Every Day     Types: Cigarettes    Smokeless tobacco: Never    Tobacco comments:     smoked a 3/4 PPD since age 18   Vaping Use    Vaping status: Never Used   Substance and Sexual Activity    Alcohol use: No    Drug use: No    Sexual activity: Defer       Family History   Problem Relation Age of Onset    Stroke Mother     Lung cancer Father        Review of Systems   Constitutional:  Positive for fatigue.   Respiratory:  Negative for apnea, chest tightness and shortness of breath.    Cardiovascular:  Negative for chest pain, palpitations and leg swelling.   Gastrointestinal:  Negative for constipation, diarrhea and nausea.   Neurological:  Positive for dizziness (getting up and down), weakness and light-headedness. Negative for syncope.   Hematological:  Bruises/bleeds easily.   Psychiatric/Behavioral:  Positive for sleep disturbance (No sob just does not sleep well).        Objective     VITALS: /64 (BP Location: Left arm, Patient Position: Sitting, Cuff Size: Adult)   Pulse 62   Ht 162 cm (63.78\")   Wt 62.6 kg (138 lb)   SpO2 100%   BMI 23.85 kg/m²     LABS:   Lab Results (most recent)       None            IMAGING:   No Images in the past 120 days found..    EXAM:  Physical Exam  Vitals and nursing note reviewed.   Constitutional:       Appearance: She " is well-developed.   HENT:      Head: Normocephalic.   Neck:      Thyroid: No thyroid mass.      Vascular: Carotid bruit (left) present. No JVD.      Trachea: Trachea and phonation normal.   Cardiovascular:      Rate and Rhythm: Normal rate and regular rhythm.      Pulses:           Radial pulses are 2+ on the right side and 2+ on the left side.        Posterior tibial pulses are 2+ on the right side and 2+ on the left side.      Heart sounds: No murmur heard.     No friction rub. Gallop present.   Pulmonary:      Effort: Pulmonary effort is normal. No respiratory distress.      Breath sounds: Normal breath sounds. No wheezing or rales.   Musculoskeletal:         General: No swelling. Normal range of motion.      Cervical back: Neck supple.   Skin:     General: Skin is warm and dry.      Capillary Refill: Capillary refill takes less than 2 seconds.      Findings: No rash.   Neurological:      Mental Status: She is alert and oriented to person, place, and time.   Psychiatric:         Speech: Speech normal.         Behavior: Behavior normal.         Thought Content: Thought content normal.         Judgment: Judgment normal.         Procedure   Procedures       Assessment & Plan    Diagnosis Plan   1. Pericardial effusion  CT Chest Without Contrast    potassium chloride 10 MEQ CR tablet    furosemide (LASIX) 20 MG tablet      2. Essential hypertension  CT Chest Without Contrast      1.  Patient's blood pressure is controlled on current blood pressure medication regimen.  No medication changes are warranted at this time.  Patient advised to monitor blood pressure on a daily basis and report any persistent highs or lows.  Set goal blood pressure for patient at 130/80 or below.  2.  Patient does have hyperlipidemia which seems to be well-controlled.  Will continue to follow.  3.  Due to patient's moderate pericardial effusion and recovered heart failure with reduced ejection fraction and normal diastolic function on her  most recent echocardiogram I would like to get a CT of her chest for further evaluation of potential causes of her effusion.  4.  Informed of signs and symptoms of ACS and advised to seek emergent treatment for any new worsening symptoms.  Patient also advised sooner follow-up as needed.  Also advised to follow-up with family doctor as needed  This note is dictated utilizing voice recognition software.  Although this record has been proof read, transcriptional errors may still be present. If questions occur regarding the content of this record please do not hesitate to call our office.  I have reviewed and confirmed the accuracy of the ROS as documented by the MA/LPN/RN MYRON Bowen    Return in about 3 months (around 10/22/2025), or if symptoms worsen or fail to improve.    Diagnoses and all orders for this visit:    1. Pericardial effusion (Primary)  -     CT Chest Without Contrast; Future  -     potassium chloride 10 MEQ CR tablet; Take 1 tablet by mouth Daily.  Dispense: 14 tablet; Refill: 0  -     furosemide (LASIX) 20 MG tablet; Take 1 tablet by mouth Daily.  Dispense: 14 tablet; Refill: 0    2. Essential hypertension  -     CT Chest Without Contrast; Future        Emerita Lew  reports that she has been smoking cigarettes. She has never used smokeless tobacco. I have educated her on the risk of diseases from using tobacco products such as cancer, COPD, and heart disease.     I advised her to quit and she is not willing to quit.    I spent 5.5 minutes counseling the patient.         Advance Care Planning   ACP discussion was held with the patient during this visit. Patient has an advance directive (not in EMR), copy requested.      BMI is within normal parameters. No other follow-up for BMI required.           MEDS ORDERED DURING VISIT:  New Medications Ordered This Visit   Medications    potassium chloride 10 MEQ CR tablet     Sig: Take 1 tablet by mouth Daily.     Dispense:  14 tablet     Refill:   0    furosemide (LASIX) 20 MG tablet     Sig: Take 1 tablet by mouth Daily.     Dispense:  14 tablet     Refill:  0           This document has been electronically signed by Carmelo Tillman Jr., APRN  July 22, 2025 09:24 EDT

## 2025-07-23 ENCOUNTER — TELEPHONE (OUTPATIENT)
Dept: CARDIOLOGY | Facility: CLINIC | Age: 89
End: 2025-07-23
Payer: MEDICARE

## 2025-07-23 NOTE — TELEPHONE ENCOUNTER
Caller: [facility]    Relationship: Other    Best call back number: [phone]    What is the best time to reach you: ANY    Who are you requesting to speak with (clinical staff, provider,  specific staff member): CLINICAL    Do you know the name of the person who called: N/A    What was the call regarding: [facility] WITH [facility] IS CALLING TO SPEAK TO MYRON VIRGEN ABOUT AN ORDER SENT OVER FOR PATIENT TO HAVE A CHEST CT WITH NO CONTRAST THE PA SIGNED ORDER BUT DR. FARRIS WAS ON THE SCHEDULING OF THE ORDER. PLEASE REACH OUT OR SEND NEW ORDER. PATIENT WILL BE IN FOR TEST ON 7-24-25 AT 11 AM.  HUB TRIED TO WARM TRANSFER.    Is it okay if the provider responds through MyChart:

## 2025-07-23 NOTE — TELEPHONE ENCOUNTER
I called and spoke with Kimberlyn and advised that Ms. Lew was Carmelo SANCHES patient . I advised that  signed the order that Dr Morejon is over the practice . They fixed order .

## 2025-07-24 ENCOUNTER — RESULTS FOLLOW-UP (OUTPATIENT)
Dept: CARDIOLOGY | Facility: CLINIC | Age: 89
End: 2025-07-24
Payer: MEDICARE

## 2025-07-24 NOTE — PROGRESS NOTES
Patient continues to have an unchanged moderate pericardial effusion.  She did have a 5 mm nodule that was unchanged in the right middle lobe.  She also had noted an enlarged left thyroid lobe.  Thyroid ultrasound was recommended for further evaluation.  Please forward this to patient's PCP for further evaluation and treatment.  Keep scheduled follow-up

## 2025-07-25 NOTE — TELEPHONE ENCOUNTER
I called and spoke with Yessica Lopez patient daughter who is listed on verbal release. I went over below results as follows:    Carmelo Tillman APRN to Me (Selected Message)      7/24/25  1:04 PM  Note      Patient continues to have an unchanged moderate pericardial effusion.  She did have a 5 mm nodule that was unchanged in the right middle lobe.  She also had noted an enlarged left thyroid lobe.  Thyroid ultrasound was recommended for further evaluation.  Please forward this to patient's PCP for further evaluation and treatment.  Keep scheduled follow-up         CT Chest Without Contrast    Faxed results to PCP office.

## (undated) DEVICE — SUT SILK 2/0 TIES 18IN A185H

## (undated) DEVICE — SNAP KOVER: Brand: UNBRANDED

## (undated) DEVICE — SYR LUERLOK 20CC BX/50

## (undated) DEVICE — ST EXT IV TBG W SECUR LK 20IN

## (undated) DEVICE — FEEDING TUBE,RADIOPAQUE: Brand: ARGYLE

## (undated) DEVICE — GOWN,PREVENTION PLUS,XXLARGE,STERILE: Brand: MEDLINE

## (undated) DEVICE — ADAPTER,CATHETER/SYRINGE/LUER,STERILE: Brand: MEDLINE

## (undated) DEVICE — PDS II VLT 0 107CM AG ST3: Brand: ENDOLOOP

## (undated) DEVICE — LUER-LOK 360°: Brand: CONNECTA, LUER-LOK

## (undated) DEVICE — ENDOPATH XCEL BLADELESS TROCARS WITH STABILITY SLEEVES: Brand: ENDOPATH XCEL

## (undated) DEVICE — ENDOPATH XCEL BLUNT TIP TROCARS WITH SMOOTH SLEEVES: Brand: ENDOPATH XCEL

## (undated) DEVICE — SYR LUERLOK 30CC

## (undated) DEVICE — ANTIBACTERIAL UNDYED BRAIDED (POLYGLACTIN 910), SYNTHETIC ABSORBABLE SUTURE: Brand: COATED VICRYL

## (undated) DEVICE — GLV SURG SENSICARE PI MIC PF SZ7 LF STRL

## (undated) DEVICE — PK LAP LASR CHOLE 10

## (undated) DEVICE — [HIGH FLOW INSUFFLATOR,  DO NOT USE IF PACKAGE IS DAMAGED,  KEEP DRY,  KEEP AWAY FROM SUNLIGHT,  PROTECT FROM HEAT AND RADIOACTIVE SOURCES.]: Brand: PNEUMOSURE

## (undated) DEVICE — SUT VIC 0 UR6 27IN VCP603H

## (undated) DEVICE — 30977 SEE SHARP - ENHANCED INTRAOPERATIVE LAPAROSCOPE CLEANING & DEFOGGING: Brand: 30977 SEE SHARP - ENHANCED INTRAOPERATIVE LAPAROSCOPE CLEANING & DEFOGGING

## (undated) DEVICE — ENDOCUT SCISSOR TIP, DISPOSABLE: Brand: RENEW

## (undated) DEVICE — CVR HNDL LIGHT RIGID

## (undated) DEVICE — GLV SURG SENSICARE PI MIC PF SZ7.5 LF STRL

## (undated) DEVICE — PATIENT RETURN ELECTRODE, SINGLE-USE, CONTACT QUALITY MONITORING, ADULT, WITH 9FT CORD, FOR PATIENTS WEIGING OVER 33LBS. (15KG): Brand: MEGADYNE

## (undated) DEVICE — ENDOPATH XCEL UNIVERSAL TROCAR STABLILITY SLEEVES: Brand: ENDOPATH XCEL

## (undated) DEVICE — ENDOPOUCH RETRIEVER SPECIMEN RETRIEVAL BAGS: Brand: ENDOPOUCH RETRIEVER